# Patient Record
Sex: FEMALE | Race: BLACK OR AFRICAN AMERICAN | Employment: FULL TIME | URBAN - METROPOLITAN AREA
[De-identification: names, ages, dates, MRNs, and addresses within clinical notes are randomized per-mention and may not be internally consistent; named-entity substitution may affect disease eponyms.]

---

## 2023-04-17 PROBLEM — J35.01 CHRONIC TONSILLITIS: Status: ACTIVE | Noted: 2020-05-07

## 2023-04-17 PROBLEM — M25.552 LEFT HIP PAIN: Status: ACTIVE | Noted: 2021-03-22

## 2023-04-17 PROBLEM — H92.01: Status: ACTIVE | Noted: 2020-05-07

## 2023-04-17 PROBLEM — J30.1 NON-SEASONAL ALLERGIC RHINITIS DUE TO POLLEN: Status: ACTIVE | Noted: 2020-05-07

## 2023-04-17 PROBLEM — D50.9 IRON DEFICIENCY ANEMIA: Status: ACTIVE | Noted: 2019-02-21

## 2023-04-17 PROBLEM — N62 HYPERTROPHY OF BREAST: Status: ACTIVE | Noted: 2018-03-28

## 2023-04-17 RX ORDER — GLUCOSAMINE HCL/CHONDROITIN SU 500-400 MG
CAPSULE ORAL
COMMUNITY
Start: 2022-05-09

## 2023-04-17 RX ORDER — GLUCAGON 3 MG/1
POWDER NASAL
COMMUNITY
Start: 2022-08-31

## 2023-04-17 RX ORDER — INSULIN GLARGINE 100 [IU]/ML
INJECTION, SOLUTION SUBCUTANEOUS
COMMUNITY
Start: 2017-05-11 | End: 2023-04-18

## 2023-04-17 RX ORDER — INSULIN LISPRO 100 [IU]/ML
INJECTION, SOLUTION INTRAVENOUS; SUBCUTANEOUS
COMMUNITY
Start: 2023-01-06

## 2023-04-17 RX ORDER — MOMETASONE FUROATE 1 MG/G
CREAM TOPICAL
COMMUNITY
Start: 2022-12-13 | End: 2025-11-12

## 2023-04-17 RX ORDER — LANCETS 30 GAUGE
EACH MISCELLANEOUS
COMMUNITY
Start: 2022-05-09

## 2023-04-17 RX ORDER — SUBCUTANEOUS INSULIN PUMP
EACH MISCELLANEOUS
COMMUNITY
Start: 2023-01-07

## 2023-04-17 RX ORDER — SUBCUTANEOUS INSULIN PUMP
EACH MISCELLANEOUS
COMMUNITY
Start: 2022-07-29

## 2023-04-17 RX ORDER — PEN NEEDLE, DIABETIC 30 GX3/16"
NEEDLE, DISPOSABLE MISCELLANEOUS
COMMUNITY
Start: 2022-05-09 | End: 2023-04-18

## 2023-04-17 RX ORDER — INSULIN ASPART 100 [IU]/ML
INJECTION, SOLUTION INTRAVENOUS; SUBCUTANEOUS
COMMUNITY
Start: 2017-05-11 | End: 2023-04-18

## 2023-04-17 RX ORDER — PROCHLORPERAZINE 25 MG/1
SUPPOSITORY RECTAL
COMMUNITY
Start: 2023-01-20

## 2023-04-17 RX ORDER — IBUPROFEN 800 MG/1
800 TABLET ORAL EVERY 8 HOURS PRN
COMMUNITY
Start: 2013-10-31

## 2023-04-17 RX ORDER — ALBUTEROL SULFATE 90 UG/1
2 AEROSOL, METERED RESPIRATORY (INHALATION) EVERY 6 HOURS PRN
COMMUNITY
Start: 2023-04-13

## 2023-04-17 RX ORDER — HYDROCODONE BITARTRATE AND ACETAMINOPHEN 5; 325 MG/1; MG/1
1 TABLET ORAL EVERY 4 HOURS PRN
COMMUNITY
Start: 2013-10-31 | End: 2023-04-18

## 2023-04-17 RX ORDER — INSULIN LISPRO 100 [IU]/ML
INJECTION, SOLUTION INTRAVENOUS; SUBCUTANEOUS
COMMUNITY
Start: 2022-07-11 | End: 2023-04-18

## 2023-04-17 RX ORDER — MONTELUKAST SODIUM 10 MG/1
10 TABLET ORAL
COMMUNITY
Start: 2023-04-13

## 2023-04-17 NOTE — PROGRESS NOTES
PROGRESS NOTE    SUBJECTIVE:   Tim Gonzales is a 40 y.o. female seen for employment physical for annual employer [employer] [employer] and biometric screening as required to obtain their insurance premium discounts. No complaints or concerns at this time. Chief Complaint    Employment Physical           OBJECTIVE:  /76 (Site: Left Upper Arm, Position: Sitting, Cuff Size: Medium Adult)   Pulse 84   Ht 5' 1\" (1.549 m)   Wt 151 lb 3.2 oz (68.6 kg)   BMI 28.57 kg/m²    There were no vitals filed for this visit. Physical Exam  Vitals reviewed. Constitutional:       General: She is awake. She is not in acute distress. Appearance: Normal appearance. She is well-developed and well-groomed. Comments: Successful venipuncture of the LAC performed, pt tolerated procedure well   Cardiovascular:      Rate and Rhythm: Normal rate and regular rhythm. Heart sounds: Normal heart sounds. Pulmonary:      Effort: Pulmonary effort is normal.   Neurological:      Mental Status: She is alert. Psychiatric:         Behavior: Behavior is cooperative. ASSESSMENT and PLAN    Erickson Isbell was seen today for employment physical.    Diagnoses and all orders for this visit:    Encounter for biometric screening  -     Lipid Panel  -     Hemoglobin A1C  -     Glucose, Random  -     COLLECTION VENOUS BLOOD,VENIPUNCTURE    Discussed HR notification process for credits and that I will contact with results when available    Counseled on benefits of having a primary care provider which includes, but is not limited to, continuity of care and having a medical home when concerns arise. Also enforced that onsite clinic policy states that we are not to take the place of a primary care provider, pt verbalized understanding. I have reviewed the patient's medication list, past medical, family, social, and surgical history in detail and updated the patient record appropriately.     Sj Yanez, APRN - CNP

## 2023-04-18 ENCOUNTER — OFFICE VISIT (OUTPATIENT)
Dept: OCCUPATIONAL MEDICINE | Age: 38
End: 2023-04-18

## 2023-04-18 VITALS
HEIGHT: 61 IN | SYSTOLIC BLOOD PRESSURE: 138 MMHG | HEART RATE: 84 BPM | BODY MASS INDEX: 28.55 KG/M2 | DIASTOLIC BLOOD PRESSURE: 76 MMHG | WEIGHT: 151.2 LBS

## 2023-04-18 DIAGNOSIS — Z00.8 ENCOUNTER FOR BIOMETRIC SCREENING: Primary | ICD-10-CM

## 2023-04-18 RX ORDER — FLUCONAZOLE 150 MG/1
TABLET ORAL
COMMUNITY
Start: 2023-02-18 | End: 2023-04-18

## 2023-04-19 LAB — HBA1C MFR BLD: 7.8 % (ref 4.8–5.6)

## 2023-04-20 LAB
CHOLEST SERPL-MCNC: 167 MG/DL (ref 100–199)
CHOLEST/HDLC SERPL: 2.7 RATIO (ref 0–4.4)
GLUCOSE SERPL-MCNC: 83 MG/DL (ref 70–99)
HDLC SERPL-MCNC: 62 MG/DL
LDLC SERPL CALC-MCNC: 91 MG/DL (ref 0–99)
TRIGL SERPL-MCNC: 75 MG/DL (ref 0–149)
VLDLC SERPL CALC-MCNC: 14 MG/DL (ref 5–40)

## 2023-05-11 ENCOUNTER — OFFICE VISIT (OUTPATIENT)
Dept: OCCUPATIONAL MEDICINE | Age: 38
End: 2023-05-11

## 2023-05-11 VITALS
OXYGEN SATURATION: 97 % | TEMPERATURE: 98.6 F | SYSTOLIC BLOOD PRESSURE: 130 MMHG | DIASTOLIC BLOOD PRESSURE: 92 MMHG | HEART RATE: 74 BPM

## 2023-05-11 DIAGNOSIS — R53.83 FATIGUE, UNSPECIFIED TYPE: Primary | ICD-10-CM

## 2023-05-11 RX ORDER — FERROUS SULFATE 300 MG/5ML
300 LIQUID (ML) ORAL EVERY OTHER DAY
Qty: 75 ML | Refills: 2 | COMMUNITY
Start: 2023-04-23 | End: 2023-07-22

## 2023-05-11 ASSESSMENT — ENCOUNTER SYMPTOMS
SHORTNESS OF BREATH: 0
NAUSEA: 0
ABDOMINAL PAIN: 0
VOMITING: 0
DIARRHEA: 0
CONSTIPATION: 0

## 2023-05-11 NOTE — PROGRESS NOTES
includes, but is not limited to, continuity of care and having a medical home when concerns arise. Also enforced that onsite clinic policy states that we are not to take the place of a primary care provider, pt verbalized understanding. SEs and risk vs benefits associated with medications prescribed discussed with patient who verbalized understanding. Pt verbalized understanding and agreement with plan of care. RTC for persisting/worsening symptoms or new complaints that arise. Discussed signs and symptoms that would warrant immediate evaluation including, but not limited to HA, blurred vision, speech disturbance, difficulty with ambulation/gait, numbness, tingling, weakness, syncope, chest pain, or shortness of breath. I have reviewed the patient's medication list, past medical, family, social, and surgical history in detail and updated the patient record appropriately.     DAVID Hardy

## 2023-08-18 ENCOUNTER — HOSPITAL ENCOUNTER (OUTPATIENT)
Dept: LAB | Age: 38
Setting detail: SPECIMEN
Discharge: HOME | End: 2023-08-21

## 2023-08-18 PROCEDURE — 86787 VARICELLA-ZOSTER ANTIBODY: CPT

## 2023-09-20 PROBLEM — Z76.89 ENCOUNTER TO ESTABLISH CARE WITH NEW DOCTOR: Status: ACTIVE | Noted: 2023-09-20

## 2023-09-20 ASSESSMENT — ENCOUNTER SYMPTOMS
NAUSEA: 0
DIARRHEA: 0
COUGH: 0
SHORTNESS OF BREATH: 0
ABDOMINAL PAIN: 0
VOMITING: 0

## 2023-09-20 NOTE — PROGRESS NOTES
Travis Reyes DO  [place], [address], [address] [facility]  [phone]        ASSESSMENT AND PLAN    Problem List Items Addressed This Visit          Endocrine    Type 1 diabetes mellitus with hyperglycemia [facility])      Patient with type 1 diabetes, diagnosed at age 10. Has done much better since being switched to an insulin pump. States that she sees endocrinology at [facility] but is due for her hemoglobin A1c. Notes that they keep her updated on her diabetic foot exam and her microalbumin test.  Will include hemoglobin A1c with her be well lab test today. We will continue to follow. Relevant Medications    Insulin Glargine, 2 Unit Dial, (TOUJEO MAX SOLOSTAR) 300 UNIT/ML SOPN    Other Relevant Orders    Hemoglobin A1C    Comprehensive Metabolic Panel       Other    Hyperlipidemia      Not currently on medication right now, will check as part of her be well screening. Iron deficiency anemia      Patient taking oral iron, will recheck blood count today. Relevant Orders    CBC with Auto Differential    Vitamin D deficiency      We will check level today. Relevant Orders    Vitamin D 25 Hydroxy    Encounter to establish care with new doctor - Primary     Other Visit Diagnoses       Annual physical exam        Relevant Orders    Be Well Health Screen    Screening for thyroid disorder        Relevant Orders    TSH             The diagnoses and plan were discussed with the patient, who verbalizes understanding and agrees with plan. All questions answered. Chief Complaint    Chief Complaint   Patient presents with    Annual Exam       HISTORY OF PRESENT ILLNESS    40 y.o. female presents today to establish care with a new primary care provider. States that she needs her be well screening for Ald[facility] Precise. Is a type I diabetic, sees endocrinology at [facility].   States that since getting on an insulin pump her blood sugars have

## 2023-09-21 ENCOUNTER — OFFICE VISIT (OUTPATIENT)
Dept: PRIMARY CARE CLINIC | Facility: CLINIC | Age: 38
End: 2023-09-21
Payer: COMMERCIAL

## 2023-09-21 VITALS
SYSTOLIC BLOOD PRESSURE: 118 MMHG | HEART RATE: 74 BPM | HEIGHT: 61 IN | BODY MASS INDEX: 29 KG/M2 | TEMPERATURE: 98.2 F | OXYGEN SATURATION: 98 % | WEIGHT: 153.6 LBS | DIASTOLIC BLOOD PRESSURE: 78 MMHG

## 2023-09-21 DIAGNOSIS — Z76.89 ENCOUNTER TO ESTABLISH CARE WITH NEW DOCTOR: Primary | ICD-10-CM

## 2023-09-21 DIAGNOSIS — Z00.00 ANNUAL PHYSICAL EXAM: ICD-10-CM

## 2023-09-21 DIAGNOSIS — Z13.29 SCREENING FOR THYROID DISORDER: ICD-10-CM

## 2023-09-21 DIAGNOSIS — E10.65 TYPE 1 DIABETES MELLITUS WITH HYPERGLYCEMIA (HCC): ICD-10-CM

## 2023-09-21 DIAGNOSIS — E55.9 VITAMIN D DEFICIENCY: ICD-10-CM

## 2023-09-21 DIAGNOSIS — D50.8 OTHER IRON DEFICIENCY ANEMIA: ICD-10-CM

## 2023-09-21 DIAGNOSIS — E78.5 HYPERLIPIDEMIA, UNSPECIFIED HYPERLIPIDEMIA TYPE: ICD-10-CM

## 2023-09-21 PROBLEM — E10.3393 TYPE 1 DIABETES MELLITUS WITH MODERATE NONPROLIFERATIVE RETINOPATHY OF BOTH EYES WITHOUT MACULAR EDEMA (HCC): Status: ACTIVE | Noted: 2018-01-24

## 2023-09-21 PROBLEM — G47.33 OSA (OBSTRUCTIVE SLEEP APNEA): Status: ACTIVE | Noted: 2023-07-24

## 2023-09-21 LAB
ALBUMIN SERPL-MCNC: 4 G/DL (ref 3.5–5)
ALBUMIN/GLOB SERPL: 1.1 (ref 0.4–1.6)
ALP SERPL-CCNC: 85 U/L (ref 50–136)
ALT SERPL-CCNC: 31 U/L (ref 12–65)
ANION GAP SERPL CALC-SCNC: 3 MMOL/L (ref 2–11)
AST SERPL-CCNC: 30 U/L (ref 15–37)
BASOPHILS # BLD: 0 K/UL (ref 0–0.2)
BASOPHILS NFR BLD: 1 % (ref 0–2)
BILIRUB SERPL-MCNC: 0.6 MG/DL (ref 0.2–1.1)
BUN SERPL-MCNC: 9 MG/DL (ref 6–23)
CALCIUM SERPL-MCNC: 9.7 MG/DL (ref 8.3–10.4)
CHLORIDE SERPL-SCNC: 110 MMOL/L (ref 101–110)
CHOLEST SERPL-MCNC: 150 MG/DL
CO2 SERPL-SCNC: 29 MMOL/L (ref 21–32)
CREAT SERPL-MCNC: 0.7 MG/DL (ref 0.6–1)
DIFFERENTIAL METHOD BLD: ABNORMAL
EOSINOPHIL # BLD: 0.1 K/UL (ref 0–0.8)
EOSINOPHIL NFR BLD: 3 % (ref 0.5–7.8)
ERYTHROCYTE [DISTWIDTH] IN BLOOD BY AUTOMATED COUNT: 16.2 % (ref 11.9–14.6)
EST. AVERAGE GLUCOSE BLD GHB EST-MCNC: 166 MG/DL
GLOBULIN SER CALC-MCNC: 3.7 G/DL (ref 2.8–4.5)
GLUCOSE SERPL-MCNC: 146 MG/DL (ref 65–100)
GLUCOSE SERPL-MCNC: 148 MG/DL (ref 65–100)
HBA1C MFR BLD: 7.4 % (ref 4.8–5.6)
HCT VFR BLD AUTO: 33.3 % (ref 35.8–46.3)
HDLC SERPL-MCNC: 44 MG/DL (ref 40–60)
HGB BLD-MCNC: 10.4 G/DL (ref 11.7–15.4)
IMM GRANULOCYTES # BLD AUTO: 0 K/UL (ref 0–0.5)
IMM GRANULOCYTES NFR BLD AUTO: 0 % (ref 0–5)
LDLC SERPL CALC-MCNC: 89.2 MG/DL
LYMPHOCYTES # BLD: 2.4 K/UL (ref 0.5–4.6)
LYMPHOCYTES NFR BLD: 54 % (ref 13–44)
MCH RBC QN AUTO: 26.7 PG (ref 26.1–32.9)
MCHC RBC AUTO-ENTMCNC: 31.2 G/DL (ref 31.4–35)
MCV RBC AUTO: 85.4 FL (ref 82–102)
MONOCYTES # BLD: 0.4 K/UL (ref 0.1–1.3)
MONOCYTES NFR BLD: 9 % (ref 4–12)
NEUTS SEG # BLD: 1.5 K/UL (ref 1.7–8.2)
NEUTS SEG NFR BLD: 33 % (ref 43–78)
NRBC # BLD: 0 K/UL (ref 0–0.2)
PLATELET # BLD AUTO: 298 K/UL (ref 150–450)
PMV BLD AUTO: 10.8 FL (ref 9.4–12.3)
POTASSIUM SERPL-SCNC: 4.4 MMOL/L (ref 3.5–5.1)
PROT SERPL-MCNC: 7.7 G/DL (ref 6.3–8.2)
RBC # BLD AUTO: 3.9 M/UL (ref 4.05–5.2)
SODIUM SERPL-SCNC: 142 MMOL/L (ref 133–143)
TRIGL SERPL-MCNC: 84 MG/DL (ref 35–150)
TSH, 3RD GENERATION: 1.13 UIU/ML (ref 0.36–3.74)
WBC # BLD AUTO: 4.5 K/UL (ref 4.3–11.1)

## 2023-09-21 PROCEDURE — 99385 PREV VISIT NEW AGE 18-39: CPT | Performed by: FAMILY MEDICINE

## 2023-09-21 RX ORDER — INSULIN GLARGINE 300 U/ML
22 INJECTION, SOLUTION SUBCUTANEOUS DAILY
COMMUNITY
Start: 2023-05-05

## 2023-09-21 SDOH — ECONOMIC STABILITY: FOOD INSECURITY: WITHIN THE PAST 12 MONTHS, YOU WORRIED THAT YOUR FOOD WOULD RUN OUT BEFORE YOU GOT MONEY TO BUY MORE.: NEVER TRUE

## 2023-09-21 SDOH — ECONOMIC STABILITY: FOOD INSECURITY: WITHIN THE PAST 12 MONTHS, THE FOOD YOU BOUGHT JUST DIDN'T LAST AND YOU DIDN'T HAVE MONEY TO GET MORE.: NEVER TRUE

## 2023-09-21 SDOH — ECONOMIC STABILITY: HOUSING INSECURITY
IN THE LAST 12 MONTHS, WAS THERE A TIME WHEN YOU DID NOT HAVE A STEADY PLACE TO SLEEP OR SLEPT IN A SHELTER (INCLUDING NOW)?: NO

## 2023-09-21 SDOH — ECONOMIC STABILITY: INCOME INSECURITY: HOW HARD IS IT FOR YOU TO PAY FOR THE VERY BASICS LIKE FOOD, HOUSING, MEDICAL CARE, AND HEATING?: NOT HARD AT ALL

## 2023-09-21 ASSESSMENT — PATIENT HEALTH QUESTIONNAIRE - PHQ9
2. FEELING DOWN, DEPRESSED OR HOPELESS: 0
SUM OF ALL RESPONSES TO PHQ QUESTIONS 1-9: 0
SUM OF ALL RESPONSES TO PHQ9 QUESTIONS 1 & 2: 0
SUM OF ALL RESPONSES TO PHQ QUESTIONS 1-9: 0
SUM OF ALL RESPONSES TO PHQ QUESTIONS 1-9: 0
1. LITTLE INTEREST OR PLEASURE IN DOING THINGS: 0
SUM OF ALL RESPONSES TO PHQ QUESTIONS 1-9: 0

## 2023-09-21 NOTE — PATIENT INSTRUCTIONS
IT WAS GREAT TO SEE YOU TODAY! I WILL CALL YOU WITH THE RESULTS OF YOUR LABS. PLEASE TAKE ALL MEDICATION AS DISCUSSED. LET ME KNOW HOW YOUR APPOINTMENT WITH VASCULAR GOES.     I WILL SEE YOU AGAIN IN 6 MONTHS BUT PLEASE CALL WITH CONCERNS [phone]

## 2023-09-21 NOTE — ASSESSMENT & PLAN NOTE
Patient with type 1 diabetes, diagnosed at age 10. Has done much better since being switched to an insulin pump. States that she sees endocrinology at [facility] but is due for her hemoglobin A1c. Notes that they keep her updated on her diabetic foot exam and her microalbumin test.  Will include hemoglobin A1c with her be well lab test today. We will continue to follow.

## 2023-09-22 ENCOUNTER — TELEPHONE (OUTPATIENT)
Dept: PRIMARY CARE CLINIC | Facility: CLINIC | Age: 38
End: 2023-09-22

## 2023-09-22 LAB — 25(OH)D3 SERPL-MCNC: 18.6 NG/ML (ref 30–100)

## 2023-09-22 RX ORDER — ERGOCALCIFEROL 1.25 MG/1
50000 CAPSULE ORAL WEEKLY
Qty: 12 CAPSULE | Refills: 1 | Status: SHIPPED | OUTPATIENT
Start: 2023-09-22

## 2023-11-01 DIAGNOSIS — N30.01 ACUTE CYSTITIS WITH HEMATURIA: Primary | ICD-10-CM

## 2023-11-01 DIAGNOSIS — N30.01 ACUTE CYSTITIS WITH HEMATURIA: ICD-10-CM

## 2023-11-01 RX ORDER — CEPHALEXIN 500 MG/1
500 CAPSULE ORAL 2 TIMES DAILY
Qty: 14 CAPSULE | Refills: 0 | Status: SHIPPED | OUTPATIENT
Start: 2023-11-01

## 2023-11-03 ENCOUNTER — OFFICE VISIT (OUTPATIENT)
Dept: OBGYN CLINIC | Age: 38
End: 2023-11-03
Payer: COMMERCIAL

## 2023-11-03 VITALS
SYSTOLIC BLOOD PRESSURE: 112 MMHG | HEIGHT: 61 IN | DIASTOLIC BLOOD PRESSURE: 68 MMHG | WEIGHT: 152 LBS | BODY MASS INDEX: 28.7 KG/M2

## 2023-11-03 DIAGNOSIS — N91.2 AMENORRHEA: ICD-10-CM

## 2023-11-03 DIAGNOSIS — N92.6 IRREGULAR MENSES: Primary | ICD-10-CM

## 2023-11-03 DIAGNOSIS — Z01.419 WOMEN'S ANNUAL ROUTINE GYNECOLOGICAL EXAMINATION: ICD-10-CM

## 2023-11-03 LAB
BACTERIA SPEC CULT: ABNORMAL
BACTERIA SPEC CULT: ABNORMAL
SERVICE CMNT-IMP: ABNORMAL

## 2023-11-03 PROCEDURE — 99385 PREV VISIT NEW AGE 18-39: CPT | Performed by: NURSE PRACTITIONER

## 2023-11-03 PROCEDURE — 76830 TRANSVAGINAL US NON-OB: CPT | Performed by: NURSE PRACTITIONER

## 2023-11-03 RX ORDER — ESCITALOPRAM OXALATE 10 MG/1
10 TABLET ORAL DAILY
Qty: 30 TABLET | Refills: 11 | Status: SHIPPED | OUTPATIENT
Start: 2023-11-03

## 2023-11-03 RX ORDER — FLUCONAZOLE 150 MG/1
150 TABLET ORAL
Qty: 2 TABLET | Refills: 0 | Status: SHIPPED | OUTPATIENT
Start: 2023-11-03 | End: 2023-11-07

## 2023-11-03 RX ORDER — MEDROXYPROGESTERONE ACETATE 10 MG/1
10 TABLET ORAL DAILY
Qty: 30 TABLET | Refills: 3 | Status: SHIPPED | OUTPATIENT
Start: 2023-11-03

## 2023-11-03 ASSESSMENT — PATIENT HEALTH QUESTIONNAIRE - PHQ9
SUM OF ALL RESPONSES TO PHQ QUESTIONS 1-9: 0
1. LITTLE INTEREST OR PLEASURE IN DOING THINGS: 0
SUM OF ALL RESPONSES TO PHQ9 QUESTIONS 1 & 2: 0
SUM OF ALL RESPONSES TO PHQ QUESTIONS 1-9: 0
2. FEELING DOWN, DEPRESSED OR HOPELESS: 0
SUM OF ALL RESPONSES TO PHQ QUESTIONS 1-9: 0
SUM OF ALL RESPONSES TO PHQ QUESTIONS 1-9: 0

## 2023-11-03 NOTE — PROGRESS NOTES
Pt comes in today for irregular periods. Pt states periods have been irregular for about a year. Pt has a period every month but days in between vary. Pt lmp before current one was 06/20--6/27. Period in June was more spotting. LAST PAP:  02/14/2022 negative negative     LAST MAMMO:  never    LMP:  Patient's last menstrual period was 11/01/2023 (exact date).     BIRTH CONTROL:  none    TOBACCO USE:  No    FAMILY HISTORY OF:   Breast Cancer:  Yes   Ovarian Cancer:  No   Uterine Cancer:  No   Colon Cancer:  No    Vitals:    11/03/23 1028   BP: 112/68   Site: Left Upper Arm   Position: Sitting   Weight: 68.9 kg (152 lb)   Height: 1.549 m (5' 1\")        Elena Buitrago  11/03/23  10:40 AM

## 2023-11-03 NOTE — PROGRESS NOTES
I have reviewed the patient's visit today including history, exam and assessment by Deborah Fabry, WHNP-BC. I agree with treatment/plan as above.     Stacia Pascal MD  11:38 AM  11/03/23

## 2023-11-03 NOTE — PROGRESS NOTES
Pearl Billy is a 41 y.o.  who is here for annual exam. Recently treated for UTI. Would like rx for diflucan prn. Known T1DM        Patient's last menstrual period was 2023 (exact date). Menses:normally Q month, but pt did not have period since  until 23  Pt does report a lot of stress lately with family and going through divorce. Has notice more anxiety. Denies SI/HI. Pt is in counseling    Birth Control:abstinence    Last Pap:2022 negative negative     Hx abnormal pap or STD:hx abnormal pap > 15 years ago, normal since then    Last Mammo: never    Fam Hx of Breast, ovarian or uterine cancer:maternal aunt breast cancer    Sexually Active:not currently            ROS:    Breast: Denies pain, lump or nipple discharge    GYN: Denies pelvic pain, discharge, itching, odor or dysuria. Constitutional: Negative for chills and fever. HENT: Negative for severe headaches or vision changes    Respiratory: Negative for cough and shortness of breath. Cardiovascular: Negative for chest pain and palpitations. Gastrointestinal: Negative for nausea and vomiting. Negative for diarrhea and constipation    Genitourinary: Negative for dysuria and hematuria.              Past Medical History:   Diagnosis Date    Abnormal Pap smear of cervix     years ago    Diabetes mellitus (HCC)     JUANA (iron deficiency anemia)        Past Surgical History:   Procedure Laterality Date    BREAST REDUCTION SURGERY       SECTION         Family History   Problem Relation Age of Onset    Breast Cancer Maternal Aunt     Ovarian Cancer Neg Hx     Uterine Cancer Neg Hx     Colon Cancer Neg Hx        Social History     Socioeconomic History    Marital status:      Spouse name: Not on file    Number of children: Not on file    Years of education: Not on file    Highest education level: Not on file   Occupational History    Not on file   Tobacco Use    Smoking status: Never    Smokeless tobacco:

## 2023-11-03 NOTE — ASSESSMENT & PLAN NOTE
RTO for fasting amenorrhea labs TSH, PRL, PCOS, POF labs  US today uterus/ ES normal, ovaries normal  If this menses is prolonged, will start daily provera until followup  Plan f/u after labs  Pt reports a lot of life stressors.  In counseling but interested in trying medication for anxiety  Will start Lexapro and f/u 4 weeks

## 2023-11-06 DIAGNOSIS — N91.2 AMENORRHEA: ICD-10-CM

## 2023-11-06 LAB
ALBUMIN SERPL-MCNC: 4.2 G/DL (ref 3.5–5)
ALBUMIN/GLOB SERPL: 1.1 (ref 0.4–1.6)
ALP SERPL-CCNC: 90 U/L (ref 50–136)
ALT SERPL-CCNC: 27 U/L (ref 12–65)
ANION GAP SERPL CALC-SCNC: 4 MMOL/L (ref 2–11)
AST SERPL-CCNC: 25 U/L (ref 15–37)
BILIRUB SERPL-MCNC: 0.4 MG/DL (ref 0.2–1.1)
BUN SERPL-MCNC: 11 MG/DL (ref 6–23)
CALCIUM SERPL-MCNC: 9.1 MG/DL (ref 8.3–10.4)
CHLORIDE SERPL-SCNC: 104 MMOL/L (ref 101–110)
CO2 SERPL-SCNC: 27 MMOL/L (ref 21–32)
CREAT SERPL-MCNC: 0.7 MG/DL (ref 0.6–1)
ERYTHROCYTE [DISTWIDTH] IN BLOOD BY AUTOMATED COUNT: 14.9 % (ref 11.9–14.6)
EST. AVERAGE GLUCOSE BLD GHB EST-MCNC: 169 MG/DL
ESTRADIOL SERPL-MCNC: 15.84 PG/ML
FSH SERPL-ACNC: 5.6 MIU/ML
GLOBULIN SER CALC-MCNC: 3.8 G/DL (ref 2.8–4.5)
GLUCOSE SERPL-MCNC: 147 MG/DL (ref 65–100)
HBA1C MFR BLD: 7.5 % (ref 4.8–5.6)
HCG SERPL QL: NEGATIVE
HCT VFR BLD AUTO: 33.8 % (ref 35.8–46.3)
HGB BLD-MCNC: 10.3 G/DL (ref 11.7–15.4)
LH SERPL-ACNC: 1.9 MIU/ML
MCH RBC QN AUTO: 26.3 PG (ref 26.1–32.9)
MCHC RBC AUTO-ENTMCNC: 30.5 G/DL (ref 31.4–35)
MCV RBC AUTO: 86.2 FL (ref 82–102)
NRBC # BLD: 0 K/UL (ref 0–0.2)
PLATELET # BLD AUTO: 316 K/UL (ref 150–450)
PMV BLD AUTO: 10.6 FL (ref 9.4–12.3)
POTASSIUM SERPL-SCNC: 3.7 MMOL/L (ref 3.5–5.1)
PROGEST SERPL-MCNC: <0.21 NG/ML
PROLACTIN SERPL-MCNC: 2.8 NG/ML
PROT SERPL-MCNC: 8 G/DL (ref 6.3–8.2)
RBC # BLD AUTO: 3.92 M/UL (ref 4.05–5.2)
SODIUM SERPL-SCNC: 135 MMOL/L (ref 133–143)
TSH W FREE THYROID IF ABNORMAL: 1.13 UIU/ML (ref 0.36–3.74)
WBC # BLD AUTO: 4.1 K/UL (ref 4.3–11.1)

## 2023-11-09 LAB
TESTOST FREE SERPL-MCNC: 0.2 PG/ML (ref 0–4.2)
TESTOST SERPL-MCNC: <3 NG/DL (ref 8–60)

## 2023-11-11 LAB — MIS SERPL-MCNC: 2.64 NG/ML

## 2023-11-16 LAB — DHEA-S SERPL-MCNC: 31.5 UG/DL (ref 57.3–279.2)

## 2023-11-22 RX ORDER — FLUCONAZOLE 150 MG/1
150 TABLET ORAL
Qty: 2 TABLET | Refills: 0 | Status: SHIPPED | OUTPATIENT
Start: 2023-11-22 | End: 2023-11-26

## 2023-11-30 ENCOUNTER — OFFICE VISIT (OUTPATIENT)
Dept: OBGYN CLINIC | Age: 38
End: 2023-11-30
Payer: COMMERCIAL

## 2023-11-30 VITALS
DIASTOLIC BLOOD PRESSURE: 76 MMHG | HEIGHT: 61 IN | BODY MASS INDEX: 28.21 KG/M2 | SYSTOLIC BLOOD PRESSURE: 118 MMHG | WEIGHT: 149.4 LBS

## 2023-11-30 DIAGNOSIS — N91.2 AMENORRHEA: ICD-10-CM

## 2023-11-30 DIAGNOSIS — R45.89 DEPRESSED MOOD: ICD-10-CM

## 2023-11-30 DIAGNOSIS — N89.8 VAGINAL DISCHARGE: ICD-10-CM

## 2023-11-30 DIAGNOSIS — N89.8 VAGINAL DISCHARGE: Primary | ICD-10-CM

## 2023-11-30 PROCEDURE — 99212 OFFICE O/P EST SF 10 MIN: CPT | Performed by: NURSE PRACTITIONER

## 2023-11-30 ASSESSMENT — PATIENT HEALTH QUESTIONNAIRE - PHQ9
7. TROUBLE CONCENTRATING ON THINGS, SUCH AS READING THE NEWSPAPER OR WATCHING TELEVISION: 2
4. FEELING TIRED OR HAVING LITTLE ENERGY: 3
SUM OF ALL RESPONSES TO PHQ QUESTIONS 1-9: 10
1. LITTLE INTEREST OR PLEASURE IN DOING THINGS: 3
8. MOVING OR SPEAKING SO SLOWLY THAT OTHER PEOPLE COULD HAVE NOTICED. OR THE OPPOSITE, BEING SO FIGETY OR RESTLESS THAT YOU HAVE BEEN MOVING AROUND A LOT MORE THAN USUAL: 0
SUM OF ALL RESPONSES TO PHQ9 QUESTIONS 1 & 2: 3
5. POOR APPETITE OR OVEREATING: 1
3. TROUBLE FALLING OR STAYING ASLEEP: 0
SUM OF ALL RESPONSES TO PHQ QUESTIONS 1-9: 10
6. FEELING BAD ABOUT YOURSELF - OR THAT YOU ARE A FAILURE OR HAVE LET YOURSELF OR YOUR FAMILY DOWN: 1
SUM OF ALL RESPONSES TO PHQ QUESTIONS 1-9: 10
9. THOUGHTS THAT YOU WOULD BE BETTER OFF DEAD, OR OF HURTING YOURSELF: 0
10. IF YOU CHECKED OFF ANY PROBLEMS, HOW DIFFICULT HAVE THESE PROBLEMS MADE IT FOR YOU TO DO YOUR WORK, TAKE CARE OF THINGS AT HOME, OR GET ALONG WITH OTHER PEOPLE: 0
2. FEELING DOWN, DEPRESSED OR HOPELESS: 0
SUM OF ALL RESPONSES TO PHQ QUESTIONS 1-9: 10

## 2023-11-30 NOTE — PROGRESS NOTES
Danielle Kowalski is a 41 y.o. Ani Mt who is here today for follow-up    Pt seen 11/3/2023 with the following concerns    \"here for annual exam. Recently treated for UTI. Would like rx for diflucan prn. Known T1DM   Patient's last menstrual period was 2023 (exact date). Menses:normally Q month, but pt did not have period since  until 23  Pt does report a lot of stress lately with family and going through divorce. Has notice more anxiety. Denies SI/HI. Pt is in counseling. \"      Patient's last menstrual period was 2023 (exact date). Past Medical History:   Diagnosis Date    Abnormal Pap smear of cervix     years ago    Diabetes mellitus (HCC)     JUANA (iron deficiency anemia)        Past Surgical History:   Procedure Laterality Date    BREAST REDUCTION SURGERY       SECTION         Family History   Problem Relation Age of Onset    Breast Cancer Maternal Aunt     Ovarian Cancer Neg Hx     Uterine Cancer Neg Hx     Colon Cancer Neg Hx        Social History     Socioeconomic History    Marital status:      Spouse name: Not on file    Number of children: Not on file    Years of education: Not on file    Highest education level: Not on file   Occupational History    Not on file   Tobacco Use    Smoking status: Never    Smokeless tobacco: Never   Substance and Sexual Activity    Alcohol use: Yes     Comment: socially 1-2 month    Drug use: Never    Sexual activity: Not Currently   Other Topics Concern    Not on file   Social History Narrative    Not on file     Social Determinants of Health     Financial Resource Strain: Low Risk  (2023)    Overall Financial Resource Strain (CARDIA)     Difficulty of Paying Living Expenses: Not hard at all   Food Insecurity: Not on file (2023)   Transportation Needs: Unknown (2023)    PRAPARE - Transportation     Lack of Transportation (Medical): Not on file     Lack of Transportation (Non-Medical):  No   Physical Activity: Not

## 2023-11-30 NOTE — ASSESSMENT & PLAN NOTE
11/3/2023: RTO for fasting amenorrhea labs TSH, PRL, PCOS, POF labs  US today uterus/ ES normal, ovaries normal  If this menses is prolonged, will start daily provera until followup  Plan f/u after labs    11/30/2023: Labs normal  LMP 11/1/2023, no menses since, not sexually active  Pt to start cyclic provera if no menses by next month and will let us know

## 2023-11-30 NOTE — PROGRESS NOTES
I have reviewed the patient's visit today including history, exam and assessment by LAURITA Edmonds. I agree with treatment/plan as above.     Isabell Guadarrama MD  3:58 PM  11/30/23

## 2023-11-30 NOTE — ASSESSMENT & PLAN NOTE
11/3/2023: Pt reports a lot of life stressors. In counseling but interested in trying medication for anxiety  Will start Lexapro and f/u 4 weeks    11/30/2023: PHQ -10. Pt reports improvement in moods, less stress, and not caring too much about small things that would normally cause distress.  Continue lexapro 10 mg daily

## 2023-12-03 ENCOUNTER — TELEPHONE (OUTPATIENT)
Dept: OBGYN CLINIC | Age: 38
End: 2023-12-03

## 2023-12-03 PROBLEM — Z01.419 WOMEN'S ANNUAL ROUTINE GYNECOLOGICAL EXAMINATION: Status: RESOLVED | Noted: 2023-11-03 | Resolved: 2023-12-03

## 2023-12-03 LAB
A VAGINAE DNA VAG QL NAA+PROBE: ABNORMAL SCORE
BVAB2 DNA VAG QL NAA+PROBE: ABNORMAL SCORE
C ALBICANS DNA VAG QL NAA+PROBE: NEGATIVE
C GLABRATA DNA VAG QL NAA+PROBE: NEGATIVE
C TRACH RRNA SPEC QL NAA+PROBE: NEGATIVE
MEGA1 DNA VAG QL NAA+PROBE: ABNORMAL SCORE
N GONORRHOEA RRNA SPEC QL NAA+PROBE: NEGATIVE
SPECIMEN SOURCE: ABNORMAL
T VAGINALIS RRNA SPEC QL NAA+PROBE: NEGATIVE

## 2023-12-03 RX ORDER — METRONIDAZOLE 500 MG/1
500 TABLET ORAL 2 TIMES DAILY
Qty: 14 TABLET | Refills: 0 | Status: SHIPPED | OUTPATIENT
Start: 2023-12-03 | End: 2023-12-10

## 2024-02-14 ENCOUNTER — TELEPHONE (OUTPATIENT)
Dept: OBGYN CLINIC | Age: 39
End: 2024-02-14

## 2024-02-14 DIAGNOSIS — E10.65 TYPE 1 DIABETES MELLITUS WITH HYPERGLYCEMIA (HCC): Primary | ICD-10-CM

## 2024-02-29 ENCOUNTER — OFFICE VISIT (OUTPATIENT)
Dept: PRIMARY CARE CLINIC | Facility: CLINIC | Age: 39
End: 2024-02-29

## 2024-02-29 VITALS
BODY MASS INDEX: 27.75 KG/M2 | DIASTOLIC BLOOD PRESSURE: 78 MMHG | HEART RATE: 86 BPM | OXYGEN SATURATION: 97 % | HEIGHT: 61 IN | WEIGHT: 147 LBS | SYSTOLIC BLOOD PRESSURE: 124 MMHG

## 2024-02-29 DIAGNOSIS — F90.9 ADULT ADHD (ATTENTION DEFICIT HYPERACTIVITY DISORDER): Primary | ICD-10-CM

## 2024-02-29 DIAGNOSIS — F41.1 GENERALIZED ANXIETY DISORDER: ICD-10-CM

## 2024-02-29 PROCEDURE — 99214 OFFICE O/P EST MOD 30 MIN: CPT | Performed by: FAMILY MEDICINE

## 2024-02-29 RX ORDER — DEXTROAMPHETAMINE SACCHARATE, AMPHETAMINE ASPARTATE MONOHYDRATE, DEXTROAMPHETAMINE SULFATE AND AMPHETAMINE SULFATE 1.25; 1.25; 1.25; 1.25 MG/1; MG/1; MG/1; MG/1
5 CAPSULE, EXTENDED RELEASE ORAL DAILY
Qty: 31 CAPSULE | Refills: 0 | Status: SHIPPED | OUTPATIENT
Start: 2024-02-29 | End: 2024-03-31

## 2024-02-29 RX ORDER — INSULIN GLARGINE 100 [IU]/ML
30 INJECTION, SOLUTION SUBCUTANEOUS NIGHTLY
COMMUNITY

## 2024-02-29 ASSESSMENT — PATIENT HEALTH QUESTIONNAIRE - PHQ9
8. MOVING OR SPEAKING SO SLOWLY THAT OTHER PEOPLE COULD HAVE NOTICED. OR THE OPPOSITE, BEING SO FIGETY OR RESTLESS THAT YOU HAVE BEEN MOVING AROUND A LOT MORE THAN USUAL: 0
9. THOUGHTS THAT YOU WOULD BE BETTER OFF DEAD, OR OF HURTING YOURSELF: 0
SUM OF ALL RESPONSES TO PHQ QUESTIONS 1-9: 0
5. POOR APPETITE OR OVEREATING: 0
1. LITTLE INTEREST OR PLEASURE IN DOING THINGS: 0
2. FEELING DOWN, DEPRESSED OR HOPELESS: 0
6. FEELING BAD ABOUT YOURSELF - OR THAT YOU ARE A FAILURE OR HAVE LET YOURSELF OR YOUR FAMILY DOWN: 0
3. TROUBLE FALLING OR STAYING ASLEEP: 0
SUM OF ALL RESPONSES TO PHQ QUESTIONS 1-9: 0
4. FEELING TIRED OR HAVING LITTLE ENERGY: 0
7. TROUBLE CONCENTRATING ON THINGS, SUCH AS READING THE NEWSPAPER OR WATCHING TELEVISION: 0
SUM OF ALL RESPONSES TO PHQ9 QUESTIONS 1 & 2: 0
10. IF YOU CHECKED OFF ANY PROBLEMS, HOW DIFFICULT HAVE THESE PROBLEMS MADE IT FOR YOU TO DO YOUR WORK, TAKE CARE OF THINGS AT HOME, OR GET ALONG WITH OTHER PEOPLE: 0

## 2024-02-29 ASSESSMENT — ENCOUNTER SYMPTOMS
SHORTNESS OF BREATH: 0
DIARRHEA: 0
VOMITING: 0
ABDOMINAL PAIN: 0
NAUSEA: 0
COUGH: 0

## 2024-02-29 NOTE — PROGRESS NOTES
Gastrointestinal:  Negative for abdominal pain, diarrhea, nausea and vomiting.   Psychiatric/Behavioral:  Positive for decreased concentration. Negative for dysphoric mood. The patient is nervous/anxious.         PHYSICAL EXAMINATION    Vitals:    02/29/24 1109   BP: 124/78   Pulse: 86   SpO2: 97%         Physical Exam  Vitals and nursing note reviewed.   Constitutional:       General: She is not in acute distress.     Appearance: Normal appearance.   HENT:      Head: Normocephalic and atraumatic.      Right Ear: External ear normal.      Left Ear: External ear normal.      Nose: Nose normal.   Eyes:      Extraocular Movements: Extraocular movements intact.      Pupils: Pupils are equal, round, and reactive to light.   Cardiovascular:      Rate and Rhythm: Normal rate and regular rhythm.      Heart sounds: Normal heart sounds. No murmur heard.  Pulmonary:      Effort: Pulmonary effort is normal. No respiratory distress.      Breath sounds: Normal breath sounds.   Abdominal:      General: Bowel sounds are normal.      Palpations: Abdomen is soft.      Tenderness: There is no abdominal tenderness. There is no right CVA tenderness or left CVA tenderness.   Musculoskeletal:         General: Normal range of motion.      Cervical back: Normal range of motion.   Skin:     General: Skin is warm.      Findings: No rash.   Neurological:      General: No focal deficit present.      Mental Status: She is alert.   Psychiatric:         Mood and Affect: Mood normal.         Behavior: Behavior normal.             Melanie Jones DO  1:15 PM  02/29/24

## 2024-02-29 NOTE — PATIENT INSTRUCTIONS
IT WAS GREAT TO SEE YOU TODAY!    PLEASE TAKE ALL MEDICATION AS DISCUSSED.    ~TAKE THE ADDERALL XR 5 MG IN THE MORNING WITH BREAKFAST TO HELP WITH FOCUS.  STOP IT AND LET ME KNOW IF IT CAUSES DIZZINESS, CHEST PAIN OR PALPITATIONS.    ~KEEP TAKING YOUR LEXAPRO TO HELP WITH ANXIETY.    WORK ON ROUTINE - TRY TO SET AN ALARM AT THE SAME TIME EVERY MORNING AND TRY TO GO TO SLEEP AT THE SAME TIME EVERY NIGHT.  ROUTINE WILL HELP YOU ORGANIZE YOUR DAY AND PREVENT YOU FROM GETTING DISTRACTED OR OFF-TASK    I WILL SEE YOU AGAIN IN 4 WEEKS BUT PLEASE CALL WITH CONCERNS [phone]

## 2024-02-29 NOTE — ASSESSMENT & PLAN NOTE
Recently started on 10 mg of Lexapro by Ob/Gyn due to increased anxiety.  Discussed continuing this while getting her ADHD under control.

## 2024-02-29 NOTE — ASSESSMENT & PLAN NOTE
Diagnosed with testing many years ago but has never taken medicine but notes that it is starting to affect her, both at work and at home.  Will try low-dose Adderall XR 5 mg to see how this works.  Will recheck in one month but advised of side effects and stopping it and letting me know if she has any uncomfortable side effects.

## 2024-03-22 ENCOUNTER — OFFICE VISIT (OUTPATIENT)
Dept: PRIMARY CARE CLINIC | Facility: CLINIC | Age: 39
End: 2024-03-22
Payer: COMMERCIAL

## 2024-03-22 VITALS — BODY MASS INDEX: 27.68 KG/M2 | WEIGHT: 146.6 LBS | OXYGEN SATURATION: 98 % | HEIGHT: 61 IN | HEART RATE: 93 BPM

## 2024-03-22 DIAGNOSIS — F90.9 ADULT ADHD (ATTENTION DEFICIT HYPERACTIVITY DISORDER): Primary | ICD-10-CM

## 2024-03-22 DIAGNOSIS — F41.1 GENERALIZED ANXIETY DISORDER: ICD-10-CM

## 2024-03-22 PROCEDURE — 99214 OFFICE O/P EST MOD 30 MIN: CPT | Performed by: FAMILY MEDICINE

## 2024-03-22 RX ORDER — DEXTROAMPHETAMINE SACCHARATE, AMPHETAMINE ASPARTATE MONOHYDRATE, DEXTROAMPHETAMINE SULFATE AND AMPHETAMINE SULFATE 3.75; 3.75; 3.75; 3.75 MG/1; MG/1; MG/1; MG/1
15 CAPSULE, EXTENDED RELEASE ORAL DAILY
Qty: 31 CAPSULE | Refills: 0 | Status: SHIPPED | OUTPATIENT
Start: 2024-03-22 | End: 2024-04-22

## 2024-03-22 ASSESSMENT — PATIENT HEALTH QUESTIONNAIRE - PHQ9
1. LITTLE INTEREST OR PLEASURE IN DOING THINGS: NOT AT ALL
2. FEELING DOWN, DEPRESSED OR HOPELESS: NOT AT ALL
SUM OF ALL RESPONSES TO PHQ9 QUESTIONS 1 & 2: 0
SUM OF ALL RESPONSES TO PHQ QUESTIONS 1-9: 0

## 2024-03-22 ASSESSMENT — ENCOUNTER SYMPTOMS
DIARRHEA: 0
SHORTNESS OF BREATH: 0
VOMITING: 0
ABDOMINAL PAIN: 0
COUGH: 0

## 2024-03-22 NOTE — PATIENT INSTRUCTIONS
IT WAS GREAT TO SEE YOU TODAY!    PLEASE TAKE ALL MEDICATION AS DISCUSSED.    ~TAKE TWO OF THE ADDERALL XR 5 MG DAILY UNTIL YOU RUN OUT.  THEN FILL THE ADDERALL XR 15 MG TO TAKE DAILY FOR BETTER FOCUS CONTROL.    ~KEEP TAKING THE LEXAPRO TO HELP WITH ANXIETY.    I WILL SEE YOU AGAIN IN 4 WEEKS BUT PLEASE CALL WITH CONCERNS [phone]

## 2024-03-22 NOTE — ASSESSMENT & PLAN NOTE
Patient has not noticed any improvement with 5 mg of Adderall XR.  Will increase to 15 mg, patient to double her dose until she runs out to start increasing the dose.  Will recheck in 4 weeks.

## 2024-03-22 NOTE — PROGRESS NOTES
[facility]  Melanie Lee Robert, DO  [address], [place] [address] [phone]         ASSESSMENT AND PLAN    Problem List Items Addressed This Visit          Other    Adult ADHD (attention deficit hyperactivity disorder) - Primary      Patient has not noticed any improvement with 5 mg of Adderall XR.  Will increase to 15 mg, patient to double her dose until she runs out to start increasing the dose.  Will recheck in 4 weeks.         Relevant Medications    amphetamine-dextroamphetamine (ADDERALL XR) 15 MG extended release capsule    Generalized anxiety disorder      Much improved on Lexapro.  Patient likes her current dose, will continue to monitor.             The diagnoses and plan were discussed with the patient, who verbalizes understanding and agrees with plan.  All questions answered.    Chief Complaint    Chief Complaint   Patient presents with    Medication Refill     Pt is here for a medication follow up.    6 Month Follow-Up         HISTORY OF PRESENT ILLNESS    38 y.o. female with ADHD presents today for follow up.  Started on Adderall XR 5 mg three weeks ago.  States that she has not noticed a difference in taking it.  Still having difficulty finishing her tasks.  States that she has not had any side effects from the medicine.  Denies palpitations, headaches or dizziness.  Denies constipation or dry mouth.  States that she likes her Lexapro and notes that it has helped her calm down.  Just saw Endocrinology, notes that she stopped using the pump and states that the insulin alone is not working.  Just got the Omnipod and states that she has training for it next week.    PAST MEDICAL HISTORY    Past Medical History:   Diagnosis Date    Abnormal Pap smear of cervix     years ago    Diabetes mellitus (HCC)     JUANA (iron deficiency anemia)        PAST SURGICAL HISTORY    Past Surgical History:   Procedure Laterality Date    BREAST REDUCTION SURGERY

## 2024-04-19 ENCOUNTER — APPOINTMENT (RX ONLY)
Dept: URBAN - METROPOLITAN AREA CLINIC 330 | Facility: CLINIC | Age: 39
Setting detail: DERMATOLOGY
End: 2024-04-19

## 2024-04-19 DIAGNOSIS — L28.1 PRURIGO NODULARIS: ICD-10-CM

## 2024-04-19 PROCEDURE — ? COUNSELING

## 2024-04-19 PROCEDURE — ? PRESCRIPTION MEDICATION MANAGEMENT

## 2024-04-19 PROCEDURE — ? PRESCRIPTION

## 2024-04-19 PROCEDURE — 99203 OFFICE O/P NEW LOW 30 MIN: CPT

## 2024-04-19 RX ORDER — CLOBETASOL PROPIONATE 0.5 MG/G
OINTMENT TOPICAL
Qty: 15 | Refills: 1 | Status: ERX | COMMUNITY
Start: 2024-04-19

## 2024-04-19 RX ADMIN — CLOBETASOL PROPIONATE: 0.5 OINTMENT TOPICAL at 00:00

## 2024-04-19 ASSESSMENT — LOCATION DETAILED DESCRIPTION DERM
LOCATION DETAILED: RIGHT SUPERIOR LATERAL UPPER BACK
LOCATION DETAILED: RIGHT PROXIMAL DORSAL FOREARM
LOCATION DETAILED: LEFT SUPERIOR LATERAL UPPER BACK
LOCATION DETAILED: LEFT PROXIMAL DORSAL FOREARM

## 2024-04-19 ASSESSMENT — LOCATION SIMPLE DESCRIPTION DERM
LOCATION SIMPLE: RIGHT FOREARM
LOCATION SIMPLE: RIGHT BACK
LOCATION SIMPLE: LEFT FOREARM
LOCATION SIMPLE: LEFT UPPER BACK

## 2024-04-19 ASSESSMENT — LOCATION ZONE DERM
LOCATION ZONE: TRUNK
LOCATION ZONE: ARM

## 2024-04-19 NOTE — PROCEDURE: PRESCRIPTION MEDICATION MANAGEMENT
Initiate Treatment: Clobetasol ointment qhs under occlusion
Render In Strict Bullet Format?: No
Detail Level: Zone
Plan: PN discussed at length

## 2024-05-10 ENCOUNTER — OFFICE VISIT (OUTPATIENT)
Dept: PRIMARY CARE CLINIC | Facility: CLINIC | Age: 39
End: 2024-05-10
Payer: COMMERCIAL

## 2024-05-10 VITALS
HEIGHT: 61 IN | BODY MASS INDEX: 27.68 KG/M2 | SYSTOLIC BLOOD PRESSURE: 112 MMHG | HEART RATE: 76 BPM | WEIGHT: 146.6 LBS | OXYGEN SATURATION: 98 % | DIASTOLIC BLOOD PRESSURE: 78 MMHG

## 2024-05-10 DIAGNOSIS — F41.1 GENERALIZED ANXIETY DISORDER: ICD-10-CM

## 2024-05-10 DIAGNOSIS — F90.9 ADULT ADHD (ATTENTION DEFICIT HYPERACTIVITY DISORDER): Primary | ICD-10-CM

## 2024-05-10 PROCEDURE — 99214 OFFICE O/P EST MOD 30 MIN: CPT | Performed by: FAMILY MEDICINE

## 2024-05-10 RX ORDER — DEXTROAMPHETAMINE SACCHARATE, AMPHETAMINE ASPARTATE MONOHYDRATE, DEXTROAMPHETAMINE SULFATE AND AMPHETAMINE SULFATE 3.75; 3.75; 3.75; 3.75 MG/1; MG/1; MG/1; MG/1
15 CAPSULE, EXTENDED RELEASE ORAL DAILY
Qty: 30 CAPSULE | Refills: 0 | Status: SHIPPED | OUTPATIENT
Start: 2024-07-10 | End: 2024-08-09

## 2024-05-10 RX ORDER — ESCITALOPRAM OXALATE 20 MG/1
20 TABLET ORAL DAILY
Qty: 90 TABLET | Refills: 1 | Status: SHIPPED | OUTPATIENT
Start: 2024-05-10

## 2024-05-10 RX ORDER — DEXTROAMPHETAMINE SACCHARATE, AMPHETAMINE ASPARTATE MONOHYDRATE, DEXTROAMPHETAMINE SULFATE AND AMPHETAMINE SULFATE 3.75; 3.75; 3.75; 3.75 MG/1; MG/1; MG/1; MG/1
15 CAPSULE, EXTENDED RELEASE ORAL DAILY
Qty: 30 CAPSULE | Refills: 0 | Status: SHIPPED | OUTPATIENT
Start: 2024-06-10 | End: 2024-07-10

## 2024-05-10 RX ORDER — DEXTROAMPHETAMINE SACCHARATE, AMPHETAMINE ASPARTATE MONOHYDRATE, DEXTROAMPHETAMINE SULFATE AND AMPHETAMINE SULFATE 3.75; 3.75; 3.75; 3.75 MG/1; MG/1; MG/1; MG/1
15 CAPSULE, EXTENDED RELEASE ORAL DAILY
Qty: 31 CAPSULE | Refills: 0 | Status: SHIPPED | OUTPATIENT
Start: 2024-05-10 | End: 2024-06-10

## 2024-05-10 ASSESSMENT — PATIENT HEALTH QUESTIONNAIRE - PHQ9
SUM OF ALL RESPONSES TO PHQ QUESTIONS 1-9: 0
SUM OF ALL RESPONSES TO PHQ QUESTIONS 1-9: 0
SUM OF ALL RESPONSES TO PHQ9 QUESTIONS 1 & 2: 0
2. FEELING DOWN, DEPRESSED OR HOPELESS: NOT AT ALL
SUM OF ALL RESPONSES TO PHQ QUESTIONS 1-9: 0
1. LITTLE INTEREST OR PLEASURE IN DOING THINGS: NOT AT ALL
SUM OF ALL RESPONSES TO PHQ QUESTIONS 1-9: 0

## 2024-05-10 ASSESSMENT — ENCOUNTER SYMPTOMS
DIARRHEA: 0
SHORTNESS OF BREATH: 0
COUGH: 0
ABDOMINAL PAIN: 0
VOMITING: 0
NAUSEA: 0

## 2024-05-10 NOTE — PATIENT INSTRUCTIONS
IT WAS GREAT TO SEE YOU TODAY!    PLEASE TAKE ALL MEDICATION AS DISCUSSED.    ~TAKE THE 20 MG OF LEXAPRO DAILY.  TAKE TWO OF YOUR 10 MG UNTIL YOU RUN OUT.    ~WE WILL CONTINUE YOU ON ADDERALL XR 15 MG DAILY FOR THE NEXT 3 MONTHS.  LET ME KNOW IF YOU HAVE ANY PROBLEMS.    I WILL SEE YOU AGAIN IN 3 MONTHS BUT PLEASE CALL WITH CONCERNS [phone]

## 2024-05-10 NOTE — PROGRESS NOTES
[facility]  Melanie Chavezndar, DO  [address]  [place] [id number] [phone]         ASSESSMENT AND PLAN    Problem List Items Addressed This Visit          Other    Adult ADHD (attention deficit hyperactivity disorder) - Primary      Doing well on 15 mg daily, will continue for the next 3 months as she feels this is working.  Sent 3 months supply.         Relevant Medications    amphetamine-dextroamphetamine (ADDERALL XR) 15 MG extended release capsule    amphetamine-dextroamphetamine (ADDERALL XR) 15 MG extended release capsule (Start on 6/10/2024)    amphetamine-dextroamphetamine (ADDERALL XR) 15 MG extended release capsule (Start on 7/10/2024)    Generalized anxiety disorder      More stress recently, patient has had to take two of her Lexapro.  Will increase dose to 20 mg, discussed use of two 10 mg pills daily until she runs out.  Will recheck in 3 months.         Relevant Medications    escitalopram (LEXAPRO) 20 MG tablet        The diagnoses and plan were discussed with the patient, who verbalizes understanding and agrees with plan.  All questions answered.    Chief Complaint    Chief Complaint   Patient presents with    1 Month Follow-Up    Anorexia     Pt has not had an appetite, for a couple of weeks now.        HISTORY OF PRESENT ILLNESS    38 y.o. female with ADHD presents today for follow up.  Last seen six weeks ago, had not noticed any change with 5 mg of Adderall XR so increased to 15 mg daily.  Was also doing better on Lexapro for her anxiety.  States that she feels like she is doing well on the 15 mg in terms of focus but notes a decreased appetite.  Does not want to lose weight, is happy where she is.  Notes that she has not had any bad side effects except occasional palpitations.  States that it is not affecting her blood sugars.  Started on the Omnipod for her blood sugars, states that she is well controlled.  Has noticed more stress

## 2024-05-10 NOTE — ASSESSMENT & PLAN NOTE
More stress recently, patient has had to take two of her Lexapro.  Will increase dose to 20 mg, discussed use of two 10 mg pills daily until she runs out.  Will recheck in 3 months.

## 2024-05-10 NOTE — ASSESSMENT & PLAN NOTE
Doing well on 15 mg daily, will continue for the next 3 months as she feels this is working.  Sent 3 months supply.

## 2024-05-15 ENCOUNTER — TELEPHONE (OUTPATIENT)
Dept: OBGYN CLINIC | Age: 39
End: 2024-05-15

## 2024-05-15 RX ORDER — FLUCONAZOLE 150 MG/1
150 TABLET ORAL
Qty: 2 TABLET | Refills: 0 | Status: SHIPPED | OUTPATIENT
Start: 2024-05-15 | End: 2024-05-19

## 2024-05-15 NOTE — TELEPHONE ENCOUNTER
Pt started on antibiotics. Would like rx for diflucan just in case she gets a yeast infection. Rx sent

## 2024-05-17 ENCOUNTER — OFFICE VISIT (OUTPATIENT)
Dept: ENDOCRINOLOGY | Age: 39
End: 2024-05-17
Payer: COMMERCIAL

## 2024-05-17 VITALS
DIASTOLIC BLOOD PRESSURE: 74 MMHG | HEART RATE: 74 BPM | WEIGHT: 141 LBS | BODY MASS INDEX: 26.64 KG/M2 | SYSTOLIC BLOOD PRESSURE: 110 MMHG

## 2024-05-17 DIAGNOSIS — Z96.41 INSULIN PUMP STATUS: ICD-10-CM

## 2024-05-17 DIAGNOSIS — E10.65 TYPE 1 DIABETES MELLITUS WITH HYPERGLYCEMIA (HCC): Primary | ICD-10-CM

## 2024-05-17 LAB — HBA1C MFR BLD: 7.8 %

## 2024-05-17 PROCEDURE — 95251 CONT GLUC MNTR ANALYSIS I&R: CPT | Performed by: PHYSICIAN ASSISTANT

## 2024-05-17 PROCEDURE — 83036 HEMOGLOBIN GLYCOSYLATED A1C: CPT | Performed by: PHYSICIAN ASSISTANT

## 2024-05-17 PROCEDURE — 99204 OFFICE O/P NEW MOD 45 MIN: CPT | Performed by: PHYSICIAN ASSISTANT

## 2024-05-17 RX ORDER — PROCHLORPERAZINE 25 MG/1
SUPPOSITORY RECTAL
Qty: 1 EACH | Refills: 3 | Status: SHIPPED | OUTPATIENT
Start: 2024-05-17

## 2024-05-17 RX ORDER — INSULIN GLARGINE 100 [IU]/ML
INJECTION, SOLUTION SUBCUTANEOUS
Qty: 15 ML | Refills: 1 | Status: SHIPPED | OUTPATIENT
Start: 2024-05-17

## 2024-05-17 RX ORDER — PROCHLORPERAZINE 25 MG/1
SUPPOSITORY RECTAL
Qty: 9 EACH | Refills: 3 | Status: SHIPPED | OUTPATIENT
Start: 2024-05-17

## 2024-05-17 RX ORDER — INSULIN LISPRO 100 [IU]/ML
INJECTION, SOLUTION INTRAVENOUS; SUBCUTANEOUS
Qty: 60 ML | Refills: 3 | Status: SHIPPED | OUTPATIENT
Start: 2024-05-17

## 2024-05-17 RX ORDER — GLUCAGON INJECTION, SOLUTION 1 MG/.2ML
1 INJECTION, SOLUTION SUBCUTANEOUS PRN
Qty: 2 EACH | Refills: 1 | Status: SHIPPED | OUTPATIENT
Start: 2024-05-17

## 2024-05-17 RX ORDER — INSULIN PMP CART,AUT,G6/7,CNTR
1 EACH SUBCUTANEOUS
COMMUNITY
Start: 2024-03-12

## 2024-05-17 RX ORDER — INSULIN PMP CART,AUT,G6/7,CNTR
EACH SUBCUTANEOUS
Qty: 30 EACH | Refills: 3 | Status: SHIPPED | OUTPATIENT
Start: 2024-05-17

## 2024-05-17 NOTE — PROGRESS NOTES
[facility] ENDOCRINOLOGY   AND   THYROID NODULE CLINIC    Hayley Cross PA-C  [facility] and [facility]roid [facility]odule [facility]  [address] [place] [address], [address]  [place] [address]  Phone [phone]  Facsimile [phone]          Wayne Francis is a 38 y.o. female seen 5/17/2024 at the request of MALLORY Guo for the evaluation of type 1 diabetes on insulin pump        Assessment and Plan:      Interpretation of 72 hour glucose monitor:  At least 72 hours of data were reviewed.  The patient utilizes a dexcom G6 continuous glucose monitoring system.  The average glucose during the reviewed timeframe was 160 with a standard deviation of 70.  There is a pattern of frequent postprandial hyperglycemia after meals.    1. Type 1 diabetes mellitus with hyperglycemia (HCC)  Pt with type 1 diabetes on insulin pump. Doing well with suboptimal control. Urged to consider attending FREE diabetes education. Pump best practices reviewed    Focus on early and accurate bolusing.    Insulin setting changes as below     Encouraged to use barrier method of birthcontrol or start prenatal vitamins. A1c goal <6.5% with good control before pregnancy    At today's visit we discussed sequela associated with uncontrolled diabetes including increased risk of stroke, heart attack, kidney failure, amputation, retinopathy, neuropathy, and nephropathy.  Patient was strongly encouraged to comply with treatment regimen as well as dietary and exercise recommendations to aid in control of this chronic disease to help prevent complications associated with uncontrolled diabetes.    RX glucagon and Urine ketone test strips    - Insulin Disposable Pump (OMNIPOD 5 G6 PODS, GEN 5,) MISC; Inject 1 each into the skin every 72 hours  - AMB POC HEMOGLOBIN A1C  - acetone, urine, test strip; Use if glucose >250 for 4 hours, if positive, proceed to ER E10.9  Dispense: 50 strip; Refill: 1  - GVOKE HYPOPEN 2-PACK 1 MG/0.2ML SOAJ; Inject 1 mg

## 2024-05-21 ENCOUNTER — TELEPHONE (OUTPATIENT)
Dept: ENDOCRINOLOGY | Age: 39
End: 2024-05-21

## 2024-05-21 NOTE — TELEPHONE ENCOUNTER
Dexcom    Prior Authorization not required for patient/medication Case ID: [id number]      Payer: [employer] Patient's Payer    [phone]   Prior Authorization is not required for this medication dosage form and strength at the quantity and days supply requested. The request for the medication has been previously approved and a current authorization exists. Prior Authorization [id number] has previously been approved for a quantity of 1 transmitter per 90 days and is active through 10/03/2024.Maximum of one fill for maintenance medications. Must use [facility]. For questions or emergencies, call [employer] at [phone]. To transfer prescription, call [facility] at [phone] option 0.   View History

## 2024-05-23 ENCOUNTER — OFFICE VISIT (OUTPATIENT)
Dept: OBGYN CLINIC | Age: 39
End: 2024-05-23
Payer: COMMERCIAL

## 2024-05-23 VITALS
BODY MASS INDEX: 26.87 KG/M2 | HEIGHT: 62 IN | SYSTOLIC BLOOD PRESSURE: 112 MMHG | WEIGHT: 146 LBS | DIASTOLIC BLOOD PRESSURE: 68 MMHG

## 2024-05-23 DIAGNOSIS — Z13.9 ENCOUNTER FOR HEALTH-RELATED SCREENING: ICD-10-CM

## 2024-05-23 DIAGNOSIS — Z13.9 ENCOUNTER FOR HEALTH-RELATED SCREENING: Primary | ICD-10-CM

## 2024-05-23 DIAGNOSIS — N91.2 AMENORRHEA: Primary | ICD-10-CM

## 2024-05-23 LAB
CHOLEST SERPL-MCNC: 151 MG/DL (ref 0–200)
GLUCOSE SERPL-MCNC: 85 MG/DL (ref 70–99)
HDLC SERPL-MCNC: 53 MG/DL (ref 40–60)
LDLC SERPL CALC-MCNC: 81 MG/DL (ref 0–100)
TRIGL SERPL-MCNC: 85 MG/DL (ref 0–150)

## 2024-05-23 PROCEDURE — 99213 OFFICE O/P EST LOW 20 MIN: CPT | Performed by: NURSE PRACTITIONER

## 2024-05-23 RX ORDER — MEDROXYPROGESTERONE ACETATE 10 MG/1
10 TABLET ORAL DAILY
Qty: 14 TABLET | Refills: 6 | Status: SHIPPED | OUTPATIENT
Start: 2024-05-23

## 2024-05-23 NOTE — PROGRESS NOTES
titrations: Strengthen carb ratio at 6 PM to 1: 9.  Labs ordered: CMP, lipid panel, urine microalbumin and TSH.  Follow-up in 3 months or sooner if needed.      Allergic rhinitis 10/29/2015     Overview Note:     Last Assessment & Plan:   Formatting of this note might be different from the original.  Seen Dr. Saucedo in remote past. Singulair and albuterol inh rx proper medication use/SE reviewed. Specific avoidance suggestions including keeping windows closed during peak pollen times (Mar-May - tree, May-Aug - grass, Aug - Oct - weed) as well as indoors as much as possible on dry, windy days      Hyperlipidemia 10/29/2015     Overview Note:     Last Assessment & Plan:   Formatting of this note might be different from the original.  Check lipids today. Not currently on statin. Continue to readdress.      Vitamin D deficiency 10/29/2015       Problem List Items Addressed This Visit          Other    Amenorrhea - Primary     11/3/2023: RTO for fasting amenorrhea labs TSH, PRL, PCOS, POF labs  US today uterus/ ES normal, ovaries normal  If this menses is prolonged, will start daily provera until followup  Plan f/u after labs     11/30/2023: Labs normal  LMP 11/1/2023, no menses since, not sexually active  Pt to start cyclic provera if no menses by next month and will let us know    5/23/2024: pt will take UPT at home  If neg, start provera x14 days  May use cyclic if needed. Pt also considering birth control. Not interested in depo or LARCS. We review OCP, ring, patch            No orders of the defined types were placed in this encounter.      Outpatient Encounter Medications as of 5/23/2024   Medication Sig Dispense Refill    medroxyPROGESTERone (PROVERA) 10 MG tablet Take 1 tablet by mouth daily 14 tablet 6    Insulin Disposable Pump (OMNIPOD 5 G6 PODS, GEN 5,) MISC Inject 1 each into the skin every 72 hours      acetone, urine, test strip Use if glucose >250 for 4 hours, if positive, proceed to ER E10.9 50 strip 1

## 2024-05-23 NOTE — ASSESSMENT & PLAN NOTE
11/3/2023: RTO for fasting amenorrhea labs TSH, PRL, PCOS, POF labs  US today uterus/ ES normal, ovaries normal  If this menses is prolonged, will start daily provera until followup  Plan f/u after labs     11/30/2023: Labs normal  LMP 11/1/2023, no menses since, not sexually active  Pt to start cyclic provera if no menses by next month and will let us know    5/23/2024: pt will take UPT at home  If neg, start provera x14 days  May use cyclic if needed. Pt also considering birth control. Not interested in depo or LARCS. We review OCP, ring, patch

## 2024-06-07 ENCOUNTER — TELEPHONE (OUTPATIENT)
Dept: OBGYN CLINIC | Age: 39
End: 2024-06-07

## 2024-06-07 NOTE — TELEPHONE ENCOUNTER
Pt would like to start nuvaring. Rx sent.   Patient denies h/o DVT, stroke, MI, migraines, liver disease or HTN

## 2024-06-09 RX ORDER — ETONOGESTREL AND ETHINYL ESTRADIOL VAGINAL RING .015; .12 MG/D; MG/D
RING VAGINAL
Qty: 3 EACH | Refills: 3 | Status: SHIPPED | OUTPATIENT
Start: 2024-06-09

## 2024-07-01 ENCOUNTER — OFFICE VISIT (OUTPATIENT)
Dept: PRIMARY CARE CLINIC | Facility: CLINIC | Age: 39
End: 2024-07-01
Payer: COMMERCIAL

## 2024-07-01 VITALS
TEMPERATURE: 97.2 F | HEART RATE: 77 BPM | WEIGHT: 150 LBS | DIASTOLIC BLOOD PRESSURE: 70 MMHG | OXYGEN SATURATION: 99 % | SYSTOLIC BLOOD PRESSURE: 110 MMHG | BODY MASS INDEX: 27.88 KG/M2

## 2024-07-01 DIAGNOSIS — B02.9 HERPES ZOSTER WITHOUT COMPLICATION: Primary | ICD-10-CM

## 2024-07-01 PROCEDURE — 99213 OFFICE O/P EST LOW 20 MIN: CPT

## 2024-07-01 RX ORDER — GABAPENTIN 300 MG/1
CAPSULE ORAL
Qty: 39 CAPSULE | Refills: 0 | Status: SHIPPED | OUTPATIENT
Start: 2024-07-01 | End: 2024-07-15

## 2024-07-01 RX ORDER — VALACYCLOVIR HYDROCHLORIDE 1 G/1
1000 TABLET, FILM COATED ORAL 3 TIMES DAILY
Qty: 30 TABLET | Refills: 0 | Status: SHIPPED | OUTPATIENT
Start: 2024-07-01 | End: 2024-07-11

## 2024-07-01 ASSESSMENT — ENCOUNTER SYMPTOMS
VOMITING: 0
COUGH: 0
NAUSEA: 0
CHEST TIGHTNESS: 0
BACK PAIN: 0
SHORTNESS OF BREATH: 0
EYES NEGATIVE: 1
BLOOD IN STOOL: 0
DIARRHEA: 0

## 2024-07-01 NOTE — PROGRESS NOTES
Allergen Reactions    Acetaminophen-Codeine      Other reaction(s): Other (see comments)    Codeine Hallucinations     Other reaction(s): Hives/Swelling-Allergy       REVIEW OF SYSTEMS    Review of Systems   Constitutional:  Negative for chills, diaphoresis, fatigue, fever and unexpected weight change.   Eyes: Negative.    Respiratory:  Negative for cough, chest tightness and shortness of breath.    Cardiovascular:  Negative for chest pain and leg swelling.   Gastrointestinal:  Negative for blood in stool, diarrhea, nausea and vomiting.   Genitourinary:  Negative for difficulty urinating and hematuria.   Musculoskeletal:  Negative for arthralgias, back pain, gait problem, joint swelling, myalgias, neck pain and neck stiffness.   Skin:  Positive for rash.   Allergic/Immunologic: Negative for environmental allergies.   Neurological:  Negative for weakness, numbness and headaches.        Sharp, shooting pain in R groin that comes & goes & is limited to that localized area. Not made better or worse by anything else.    Psychiatric/Behavioral:  Positive for sleep disturbance.           PHYSICAL EXAMINATION    Vitals:    07/01/24 0807   BP: 110/70   Pulse: 77   Temp: 97.2 °F (36.2 °C)   SpO2: 99%         Physical Exam  Vitals reviewed.   Constitutional:       General: She is not in acute distress.     Appearance: Normal appearance. She is normal weight. She is not ill-appearing or toxic-appearing.   HENT:      Head: Normocephalic and atraumatic.   Eyes:      Pupils: Pupils are equal, round, and reactive to light.   Cardiovascular:      Rate and Rhythm: Normal rate and regular rhythm.      Pulses: Normal pulses.      Heart sounds: Normal heart sounds. No murmur heard.     No friction rub.   Pulmonary:      Effort: Pulmonary effort is normal. No respiratory distress.      Breath sounds: Normal breath sounds.   Chest:      Chest wall: No tenderness.   Abdominal:      General: Abdomen is flat.      Palpations: Abdomen is

## 2024-07-01 NOTE — ASSESSMENT & PLAN NOTE
Early presentation & clinical diagnosis based on localized neuralgic symptoms & history of chicken pox. 2 possible small beginnings of lesions, but again, early presentation. Ordered Valacyclovir 1 g tid x 10 days. Patient also requested something for pain due to insomnia from pain last night. Declined oxycodone due to allergy & prednisone due to type 1 diabetes. She was agreeable to trying GABApentin, despite caution that may be less effective or ineffective in shingles. We will do 3 day taper up to tid & then tid for remainder of 14 day treatment. Patient advised to drink lots of water, to d/c the gabapentin, if not effective after 3-4 days & to contact the office with any worsening of symptoms or other concerns.

## 2024-07-01 NOTE — PATIENT INSTRUCTIONS
IT WAS GREAT TO MEET YOU TODAY!    PLEASE TAKE ALL MEDICATION AS DISCUSSED. DRINK LOTS OF WATER. IF SYMPTOMS DO NOT IMPROVE AFTER VALACYCLOVIR, PLEASE CONTACT OFFICE. I HOPE YOU SLEEP BETTER WITH THE GABAPENTIN, BUT PLEASE DISCONTINUE, IF NOT HELPING AFTER DAY 3 OR 4 AND LET US KNOW, IF YOU NEED ALTERNATIVE PAIN MANAGEMENT.    WEAR YOUR SEATBELT.     I HOPE YOU FEEL BETTER SOON. WE WILL SEE YOU AGAIN ON 8/16/2024 AT 11:40 AM, BUT PLEASE CALL WITH CONCERNS [phone]

## 2024-07-10 ENCOUNTER — NURSE ONLY (OUTPATIENT)
Dept: PRIMARY CARE CLINIC | Facility: CLINIC | Age: 39
End: 2024-07-10
Payer: COMMERCIAL

## 2024-07-10 DIAGNOSIS — L30.9 ECZEMA, UNSPECIFIED TYPE: Primary | ICD-10-CM

## 2024-07-10 PROCEDURE — 96372 THER/PROPH/DIAG INJ SC/IM: CPT | Performed by: FAMILY MEDICINE

## 2024-07-10 RX ORDER — DEXAMETHASONE SODIUM PHOSPHATE 4 MG/ML
8 INJECTION, SOLUTION INTRA-ARTICULAR; INTRALESIONAL; INTRAMUSCULAR; INTRAVENOUS; SOFT TISSUE ONCE
Status: COMPLETED | OUTPATIENT
Start: 2024-07-10 | End: 2024-07-10

## 2024-07-10 RX ADMIN — DEXAMETHASONE SODIUM PHOSPHATE 8 MG: 4 INJECTION, SOLUTION INTRA-ARTICULAR; INTRALESIONAL; INTRAMUSCULAR; INTRAVENOUS; SOFT TISSUE at 08:49

## 2024-07-10 NOTE — PROGRESS NOTES
Notes worsening rash that is very itchy.  Has eczema and is having more scarring.  Will have Jael give her a Decadron injection.

## 2024-07-24 ENCOUNTER — PATIENT MESSAGE (OUTPATIENT)
Dept: ENDOCRINOLOGY | Age: 39
End: 2024-07-24

## 2024-07-24 NOTE — TELEPHONE ENCOUNTER
Beckie response:    Hi,  It is possible but we would need to document why you need early change out. Can you tell me more about what is going on causing you to change early?    Sudhir

## 2024-07-24 NOTE — TELEPHONE ENCOUNTER
From: Wayne Francis  To: Hayley Cross  Sent: 7/24/2024 12:59 PM EDT  Subject: Omnipod    Hey I was wondering if it possible to have my rx written to change my Omnipod every two days instead of three?

## 2024-07-25 NOTE — TELEPHONE ENCOUNTER
Beckie response:    Hi,  That is problematic and not typical. I recommend that you call insulet/omnipod and verify that you are following their recommendations for placement and to see if they will replace PODs that have gone out early and that will help us document that you are running out early.    There are also some online videos you can watch about placing PODs    Please let me know how things are going after you touch base with  insulet/omnipod.    I'll see you in less than a month    ~Hayley

## 2024-08-01 ENCOUNTER — PATIENT MESSAGE (OUTPATIENT)
Dept: PRIMARY CARE CLINIC | Facility: CLINIC | Age: 39
End: 2024-08-01

## 2024-08-01 DIAGNOSIS — L30.9 ECZEMA, UNSPECIFIED TYPE: Primary | ICD-10-CM

## 2024-08-01 NOTE — TELEPHONE ENCOUNTER
Karin Klein 8/1/2024 4:42 PM EDT      ----- Message -----  From: Wayne Francis  Sent: 8/1/2024 4:40 PM EDT  To: *  Subject: Dermatology     Hey do you think that I can get a referral to Dermatology? I called to make an appointment at [facility] with MARLENE but they said I needed a referral from my primary.

## 2024-08-14 ENCOUNTER — PATIENT MESSAGE (OUTPATIENT)
Dept: OBGYN CLINIC | Age: 39
End: 2024-08-14

## 2024-08-14 RX ORDER — LEVONORGESTREL/ETHIN.ESTRADIOL 0.1-0.02MG
1 TABLET ORAL DAILY
Qty: 84 TABLET | Refills: 3 | Status: SHIPPED | OUTPATIENT
Start: 2024-08-14

## 2024-08-14 RX ORDER — LEVONORGESTREL/ETHIN.ESTRADIOL 0.1-0.02MG
1 TABLET ORAL DAILY
Qty: 84 TABLET | Refills: 3 | Status: SHIPPED | OUTPATIENT
Start: 2024-08-14 | End: 2024-08-14 | Stop reason: ALTCHOICE

## 2024-08-14 NOTE — TELEPHONE ENCOUNTER
Rx sent. Pt advised in person she can take UPT today. If negative, can start OCPs and repeat UPT in 2 weeks if sexually active.

## 2024-09-01 LAB — DIABETIC RETINOPATHY: NEGATIVE

## 2024-09-25 RX ORDER — LEVONORGESTREL/ETHIN.ESTRADIOL 0.1-0.02MG
1 TABLET ORAL DAILY
Qty: 84 TABLET | Refills: 3 | Status: SHIPPED | OUTPATIENT
Start: 2024-09-25

## 2024-10-03 ENCOUNTER — OFFICE VISIT (OUTPATIENT)
Dept: PRIMARY CARE CLINIC | Facility: CLINIC | Age: 39
End: 2024-10-03
Payer: COMMERCIAL

## 2024-10-03 VITALS
BODY MASS INDEX: 28.89 KG/M2 | DIASTOLIC BLOOD PRESSURE: 70 MMHG | WEIGHT: 153 LBS | TEMPERATURE: 97.4 F | HEIGHT: 61 IN | SYSTOLIC BLOOD PRESSURE: 100 MMHG

## 2024-10-03 DIAGNOSIS — H01.00A BLEPHARITIS OF BOTH UPPER AND LOWER EYELID OF RIGHT EYE, UNSPECIFIED TYPE: Primary | ICD-10-CM

## 2024-10-03 PROCEDURE — 99213 OFFICE O/P EST LOW 20 MIN: CPT | Performed by: FAMILY MEDICINE

## 2024-10-03 RX ORDER — OFLOXACIN 3 MG/ML
5 SOLUTION AURICULAR (OTIC) 2 TIMES DAILY
Qty: 5 ML | Refills: 0 | Status: SHIPPED | OUTPATIENT
Start: 2024-10-03 | End: 2024-10-13

## 2024-10-03 RX ORDER — OFLOXACIN 3 MG/ML
3 SOLUTION/ DROPS OPHTHALMIC 4 TIMES DAILY
Qty: 10 ML | Refills: 0 | Status: SHIPPED | OUTPATIENT
Start: 2024-10-03 | End: 2024-10-10

## 2024-10-03 ASSESSMENT — ENCOUNTER SYMPTOMS
EYE ITCHING: 1
SHORTNESS OF BREATH: 0
ABDOMINAL PAIN: 0
VOMITING: 0
NAUSEA: 0
COUGH: 0
DIARRHEA: 0

## 2024-10-03 NOTE — ASSESSMENT & PLAN NOTE
New issue, likely related to using a new eyeliner. She has thrown this away.  Discussed warm compresses, antihistamines and will add Ofloxacin to protect from infection.  Advised not to wear eye makeup for a few days.

## 2024-10-03 NOTE — PROGRESS NOTES
Pt states that she was given ear drops instead of eye drops. Requests the [facility] on the [address].   
Reactions    Codeine Hallucinations     Other reaction(s): Hives/Swelling-Allergy       REVIEW OF SYSTEMS    Review of Systems   Constitutional:  Negative for fever.   HENT:  Negative for congestion.    Eyes:  Positive for itching. Negative for visual disturbance.   Respiratory:  Negative for cough and shortness of breath.    Cardiovascular:  Negative for chest pain.   Gastrointestinal:  Negative for abdominal pain, diarrhea, nausea and vomiting.   Psychiatric/Behavioral:  Negative for dysphoric mood.           PHYSICAL EXAMINATION    Vitals:    10/03/24 0948   BP: 100/70   Temp: 97.4 °F (36.3 °C)         Physical Exam  Vitals and nursing note reviewed.   Constitutional:       General: She is not in acute distress.     Appearance: Normal appearance.   HENT:      Head: Normocephalic and atraumatic.      Right Ear: External ear normal.      Left Ear: External ear normal.      Nose: Nose normal.   Eyes:      Extraocular Movements: Extraocular movements intact.      Pupils: Pupils are equal, round, and reactive to light.      Comments: Upper and lower eyelid swelling without visible stye or discharge, mild irritation without conjunctival injection   Pulmonary:      Effort: Pulmonary effort is normal. No respiratory distress.   Musculoskeletal:         General: Normal range of motion.      Cervical back: Normal range of motion.   Skin:     General: Skin is warm.      Findings: No rash.   Neurological:      General: No focal deficit present.      Mental Status: She is alert.   Psychiatric:         Mood and Affect: Mood normal.         Behavior: Behavior normal.           Melanie Jones DO  10:17 AM  10/03/24

## 2024-10-09 ENCOUNTER — NURSE ONLY (OUTPATIENT)
Dept: PRIMARY CARE CLINIC | Facility: CLINIC | Age: 39
End: 2024-10-09
Payer: COMMERCIAL

## 2024-10-09 PROCEDURE — 90661 CCIIV3 VAC ABX FR 0.5 ML IM: CPT | Performed by: FAMILY MEDICINE

## 2024-10-09 PROCEDURE — 90471 IMMUNIZATION ADMIN: CPT | Performed by: FAMILY MEDICINE

## 2024-10-09 SDOH — ECONOMIC STABILITY: FOOD INSECURITY: WITHIN THE PAST 12 MONTHS, THE FOOD YOU BOUGHT JUST DIDN'T LAST AND YOU DIDN'T HAVE MONEY TO GET MORE.: NEVER TRUE

## 2024-10-09 SDOH — ECONOMIC STABILITY: FOOD INSECURITY: WITHIN THE PAST 12 MONTHS, YOU WORRIED THAT YOUR FOOD WOULD RUN OUT BEFORE YOU GOT MONEY TO BUY MORE.: NEVER TRUE

## 2024-10-09 SDOH — ECONOMIC STABILITY: INCOME INSECURITY: HOW HARD IS IT FOR YOU TO PAY FOR THE VERY BASICS LIKE FOOD, HOUSING, MEDICAL CARE, AND HEATING?: NOT HARD AT ALL

## 2024-10-29 ENCOUNTER — OFFICE VISIT (OUTPATIENT)
Dept: PRIMARY CARE CLINIC | Facility: CLINIC | Age: 39
End: 2024-10-29
Payer: COMMERCIAL

## 2024-10-29 VITALS
WEIGHT: 157 LBS | HEART RATE: 79 BPM | DIASTOLIC BLOOD PRESSURE: 82 MMHG | SYSTOLIC BLOOD PRESSURE: 112 MMHG | BODY MASS INDEX: 29.64 KG/M2 | HEIGHT: 61 IN | TEMPERATURE: 97.3 F | OXYGEN SATURATION: 98 %

## 2024-10-29 DIAGNOSIS — J06.9 VIRAL UPPER RESPIRATORY TRACT INFECTION: Primary | ICD-10-CM

## 2024-10-29 LAB
EXP DATE SOLUTION: NORMAL
EXP DATE SWAB: NORMAL
EXPIRATION DATE: NORMAL
LOT NUMBER POC: NORMAL
LOT NUMBER SOLUTION: NORMAL
LOT NUMBER SWAB: NORMAL
QUICKVUE INFLUENZA TEST: NEGATIVE
SARS-COV-2 RNA, POC: NEGATIVE
VALID INTERNAL CONTROL, POC: YES

## 2024-10-29 PROCEDURE — 99213 OFFICE O/P EST LOW 20 MIN: CPT | Performed by: FAMILY MEDICINE

## 2024-10-29 PROCEDURE — 87635 SARS-COV-2 COVID-19 AMP PRB: CPT | Performed by: FAMILY MEDICINE

## 2024-10-29 PROCEDURE — 87804 INFLUENZA ASSAY W/OPTIC: CPT | Performed by: FAMILY MEDICINE

## 2024-10-29 ASSESSMENT — PATIENT HEALTH QUESTIONNAIRE - PHQ9
SUM OF ALL RESPONSES TO PHQ QUESTIONS 1-9: 0
2. FEELING DOWN, DEPRESSED OR HOPELESS: NOT AT ALL
SUM OF ALL RESPONSES TO PHQ QUESTIONS 1-9: 0
1. LITTLE INTEREST OR PLEASURE IN DOING THINGS: NOT AT ALL
SUM OF ALL RESPONSES TO PHQ QUESTIONS 1-9: 0
SUM OF ALL RESPONSES TO PHQ QUESTIONS 1-9: 0
SUM OF ALL RESPONSES TO PHQ9 QUESTIONS 1 & 2: 0

## 2024-10-29 ASSESSMENT — ENCOUNTER SYMPTOMS
ABDOMINAL PAIN: 0
VOMITING: 0
SHORTNESS OF BREATH: 0
COUGH: 0
DIARRHEA: 0
NAUSEA: 0

## 2024-10-29 NOTE — ASSESSMENT & PLAN NOTE
One day of symptoms, afebrile.  Exam consistent with URI, negative Flu and COVID.  Discussed use of OTC Nyquil and the importance of hydration.  OK to use Tylenol and Motrin if needed for symptoms.  Patient to let me know if symptoms worsen.

## 2024-10-29 NOTE — PATIENT INSTRUCTIONS
IT WAS GREAT TO SEE YOU TODAY!    I WILL CONTACT YOU WITH THE RESULTS OF YOUR LABS.    PLEASE TAKE ALL MEDICATION AS DISCUSSED.    I WILL SEE YOU AGAIN IN 3 MONTHS BUT PLEASE CALL WITH CONCERNS [phone]

## 2024-10-29 NOTE — PROGRESS NOTES
[facility]  Melanie Lee Robert, DO  [address], [place] [address] [phone]         ASSESSMENT AND PLAN    Problem List Items Addressed This Visit          Respiratory    Viral upper respiratory tract infection - Primary     One day of symptoms, afebrile.  Exam consistent with URI, negative Flu and COVID.  Discussed use of OTC Nyquil and the importance of hydration.  OK to use Tylenol and Motrin if needed for symptoms.  Patient to let me know if symptoms worsen.             The diagnoses and plan were discussed with the patient, who verbalizes understanding and agrees with plan.  All questions answered.    Chief Complaint    Chief Complaint   Patient presents with    Cough    Pharyngitis    Headache       HISTORY OF PRESENT ILLNESS    39 y.o. female with type I DM presents today for congestion.  Started feeling bad last night, had poor appetite when she got home.  Notes coughing and a mild runny nose.  Denies headaches.  Feels weak and tired.  Denies nausea, vomiting or diarrhea.  Denies wheezing.  Notes pain in her chest when she coughs but notes it is dry.  Feels some congestion in her chest.  Notes coworker had COVID last week.    PAST MEDICAL HISTORY    Past Medical History:   Diagnosis Date    Abnormal Pap smear of cervix     years ago    Diabetes mellitus (HCC)     JUANA (iron deficiency anemia)        PAST SURGICAL HISTORY    Past Surgical History:   Procedure Laterality Date    BREAST REDUCTION SURGERY       SECTION         FAMILY HISTORY    Family History   Problem Relation Age of Onset    Diabetes type 2  Mother     Other Mother         loss of muscle tone of upper body    Rheum Arthritis Mother     No Known Problems Sister     No Known Problems Brother     Dementia Maternal Grandmother     Heart Disease Maternal Grandmother     Diabetes type 2  Maternal Grandmother     Elevated Lipids Maternal Grandmother     Cancer Maternal Grandfather

## 2024-11-03 ENCOUNTER — APPOINTMENT (OUTPATIENT)
Dept: GENERAL RADIOLOGY | Age: 39
End: 2024-11-03
Payer: COMMERCIAL

## 2024-11-03 ENCOUNTER — HOSPITAL ENCOUNTER (EMERGENCY)
Age: 39
Discharge: HOME | End: 2024-11-03
Attending: EMERGENCY MEDICINE
Payer: COMMERCIAL

## 2024-11-03 VITALS
BODY MASS INDEX: 28.32 KG/M2 | WEIGHT: 150 LBS | SYSTOLIC BLOOD PRESSURE: 120 MMHG | HEART RATE: 72 BPM | OXYGEN SATURATION: 100 % | TEMPERATURE: 98.3 F | DIASTOLIC BLOOD PRESSURE: 90 MMHG | RESPIRATION RATE: 22 BRPM | HEIGHT: 61 IN

## 2024-11-03 DIAGNOSIS — J18.9 PNEUMONIA OF RIGHT LOWER LOBE DUE TO INFECTIOUS ORGANISM: Primary | ICD-10-CM

## 2024-11-03 LAB
ALBUMIN SERPL-MCNC: 3.3 G/DL (ref 3.5–5)
ALBUMIN/GLOB SERPL: 0.9 (ref 1–1.9)
ALP SERPL-CCNC: 64 U/L (ref 35–104)
ALT SERPL-CCNC: 14 U/L (ref 8–45)
ANION GAP SERPL CALC-SCNC: 13 MMOL/L (ref 7–16)
AST SERPL-CCNC: 37 U/L (ref 15–37)
BASOPHILS # BLD: 0 K/UL (ref 0–0.2)
BASOPHILS NFR BLD: 1 % (ref 0–2)
BILIRUB SERPL-MCNC: 0.3 MG/DL (ref 0–1.2)
BUN SERPL-MCNC: 9 MG/DL (ref 6–23)
CALCIUM SERPL-MCNC: 8.6 MG/DL (ref 8.8–10.2)
CHLORIDE SERPL-SCNC: 103 MMOL/L (ref 98–107)
CO2 SERPL-SCNC: 23 MMOL/L (ref 20–29)
CREAT SERPL-MCNC: 0.73 MG/DL (ref 0.6–1.1)
DIFFERENTIAL METHOD BLD: ABNORMAL
EKG ATRIAL RATE: 63 BPM
EKG DIAGNOSIS: NORMAL
EKG P AXIS: 84 DEGREES
EKG P-R INTERVAL: 137 MS
EKG Q-T INTERVAL: 350 MS
EKG QRS DURATION: 72 MS
EKG QTC CALCULATION (BAZETT): 359 MS
EKG R AXIS: 79 DEGREES
EKG T AXIS: 56 DEGREES
EKG VENTRICULAR RATE: 63 BPM
EOSINOPHIL # BLD: 0.3 K/UL (ref 0–0.8)
EOSINOPHIL NFR BLD: 5 % (ref 0.5–7.8)
ERYTHROCYTE [DISTWIDTH] IN BLOOD BY AUTOMATED COUNT: 13.3 % (ref 11.9–14.6)
GLOBULIN SER CALC-MCNC: 3.8 G/DL (ref 2.3–3.5)
GLUCOSE SERPL-MCNC: 141 MG/DL (ref 70–99)
HCT VFR BLD AUTO: 31.8 % (ref 35.8–46.3)
HGB BLD-MCNC: 10.1 G/DL (ref 11.7–15.4)
IMM GRANULOCYTES # BLD AUTO: 0 K/UL (ref 0–0.5)
IMM GRANULOCYTES NFR BLD AUTO: 0 % (ref 0–5)
LYMPHOCYTES # BLD: 2.7 K/UL (ref 0.5–4.6)
LYMPHOCYTES NFR BLD: 47 % (ref 13–44)
MCH RBC QN AUTO: 27.5 PG (ref 26.1–32.9)
MCHC RBC AUTO-ENTMCNC: 31.8 G/DL (ref 31.4–35)
MCV RBC AUTO: 86.6 FL (ref 82–102)
MONOCYTES # BLD: 0.5 K/UL (ref 0.1–1.3)
MONOCYTES NFR BLD: 9 % (ref 4–12)
NEUTS SEG # BLD: 2.1 K/UL (ref 1.7–8.2)
NEUTS SEG NFR BLD: 38 % (ref 43–78)
NRBC # BLD: 0 K/UL (ref 0–0.2)
NT PRO BNP: 177 PG/ML (ref 0–125)
PLATELET # BLD AUTO: 281 K/UL (ref 150–450)
PMV BLD AUTO: 10.4 FL (ref 9.4–12.3)
POTASSIUM SERPL-SCNC: 5 MMOL/L (ref 3.5–5.1)
PROT SERPL-MCNC: 7.1 G/DL (ref 6.3–8.2)
RBC # BLD AUTO: 3.67 M/UL (ref 4.05–5.2)
SODIUM SERPL-SCNC: 139 MMOL/L (ref 136–145)
TROPONIN T SERPL HS-MCNC: <6 NG/L (ref 0–14)
WBC # BLD AUTO: 5.6 K/UL (ref 4.3–11.1)

## 2024-11-03 PROCEDURE — 84484 ASSAY OF TROPONIN QUANT: CPT

## 2024-11-03 PROCEDURE — 93005 ELECTROCARDIOGRAM TRACING: CPT | Performed by: EMERGENCY MEDICINE

## 2024-11-03 PROCEDURE — 71045 X-RAY EXAM CHEST 1 VIEW: CPT

## 2024-11-03 PROCEDURE — 93010 ELECTROCARDIOGRAM REPORT: CPT | Performed by: INTERNAL MEDICINE

## 2024-11-03 PROCEDURE — 6370000000 HC RX 637 (ALT 250 FOR IP): Performed by: EMERGENCY MEDICINE

## 2024-11-03 PROCEDURE — 83880 ASSAY OF NATRIURETIC PEPTIDE: CPT

## 2024-11-03 PROCEDURE — 80053 COMPREHEN METABOLIC PANEL: CPT

## 2024-11-03 PROCEDURE — 99285 EMERGENCY DEPT VISIT HI MDM: CPT

## 2024-11-03 PROCEDURE — 85025 COMPLETE CBC W/AUTO DIFF WBC: CPT

## 2024-11-03 RX ORDER — LEVOFLOXACIN 500 MG/1
750 TABLET, FILM COATED ORAL ONCE
Status: COMPLETED | OUTPATIENT
Start: 2024-11-03 | End: 2024-11-03

## 2024-11-03 RX ORDER — ALBUTEROL SULFATE 90 UG/1
2 INHALANT RESPIRATORY (INHALATION) EVERY 6 HOURS PRN
Qty: 1 EACH | Refills: 1 | Status: SHIPPED | OUTPATIENT
Start: 2024-11-03

## 2024-11-03 RX ORDER — LEVOFLOXACIN 750 MG/1
750 TABLET, FILM COATED ORAL DAILY
Qty: 6 TABLET | Refills: 0 | Status: SHIPPED | OUTPATIENT
Start: 2024-11-03 | End: 2024-11-09

## 2024-11-03 RX ADMIN — LEVOFLOXACIN 750 MG: 500 TABLET, FILM COATED ORAL at 10:32

## 2024-11-03 ASSESSMENT — PAIN DESCRIPTION - LOCATION: LOCATION: CHEST

## 2024-11-03 ASSESSMENT — PAIN SCALES - GENERAL: PAINLEVEL_OUTOF10: 6

## 2024-11-03 ASSESSMENT — PAIN - FUNCTIONAL ASSESSMENT: PAIN_FUNCTIONAL_ASSESSMENT: 0-10

## 2024-11-03 NOTE — ED TRIAGE NOTES
Pt ambulated to triage    Pt states she has had chest pain and sob since Tuesday.  Pt's PCP, checked for covid and flu.  Both negative.  No n/v, gerd, or arm pain or tingling.

## 2024-11-03 NOTE — ED PROVIDER NOTES
Emergency Department Provider Note                   PCP:                Melanie Jones DO               Age: 39 y.o.      Sex: female   Final diagnosis/impression:  1. Pneumonia of right lower lobe due to infectious organism       Disposition: Discharged    MDM/Clinical Course:  Patient seen by myself at the {Hospital Site:[facility]} emergency department. Patient had signs symptoms and clinical history most consistent with []. My independent analysis/interpretation of laboratory work-up here shows []. Radiology shows []. While under my care, patient received [].    Did discuss with patient that medications given during visit / discharge prescriptions would not nessarily be those selected if patient were pregnant and such medications could potentially cause harm to a developing fetus or pregnancy. Patient is declining pregnancy testing during visit which is reasonable and within her choice as patient here in the emergency department.  Patient does not feel she could be pregnant.    Complexity of Problems Addressed:  {Complexity:78569}    Data Reviewed and Analyzed:    Category 1:   {external source:04722}  I ordered each unique test.  I reviewed the results of each unique test.  {Historian (state who, why needed, what they said):77395}    Category 2:   My Independent EKG Interpretation:   Sinus rhythm, rate of 63, no ST elevation MI, QTc is 359 and is not prolonged, QRS is 72 and is not widened    Radiology:  My limited/focused independent chest x-ray review shows no pneumothorax, no pneumonia, no cardiomegaly, no aortic widening, no noted focal consolidation. [].   Please see official radiology read for additional information, further findings and official interpretation.     Category 3: Discussion of management or test interpretation.  See MDM / clinical course section above for details    Risk of Complications and/or Morbidity of Patient Management:  {Risk:99407}  {Admitted or Consultants

## 2024-11-03 NOTE — DISCHARGE INSTRUCTIONS
We recommend you schedule your albuterol inhaler use every 6 hours for the next 3 days or so.      Please return for any questions, concerns or worsening symptoms, particularly increasing shortness of breath, persistent wheezing, increasing chest pain, passing out episodes.

## 2024-11-04 ENCOUNTER — CARE COORDINATION (OUTPATIENT)
Dept: OTHER | Facility: CLINIC | Age: 39
End: 2024-11-04

## 2024-11-04 NOTE — CARE COORDINATION
Ambulatory Care Coordination Note     2024 1:30 PM     Patient Current Location:  Home: [address]  [place] [address]     This patient was received as a referral from [facility] health report .    ACM contacted the patient by telephone. Verified name and  with patient as identifiers. Provided introduction to self, and explanation of the ACM role.   Patient declined care management services at this time.          ACM: Sharon Erazo LPN     Challenges to be reviewed by the provider   Additional needs identified to be addressed with provider No  none               Method of communication with provider: none.    Has the patient been seen in the ED since your last call? no    Care Summary Note: Patient was seen in the ER due to complaints of CP,cough,SOB. Reports being seen by PCP days prior to ER visit and negative for Covid and Flu.   Work up in ER with labs and CXR showed RLL pneumonia. Patient was stable and discharged home with Rx for Levaquin.   Patient reports today she is slowly improving, she is taking the Levaquin as directed with no side effects.   Discussed DM with patient who reports blood sugars are running normal and she has all meds and is compliant with diet an follow up. Advised to monitor blood sugars closely during illness. Patient plans to call PCP today and schedule ER follow up. Patient states she feels all care needs in place and does not identify any CM needs at this time.    Offered patient enrollment in the Remote Patient Monitoring (RPM) program for in-home monitoring: Patient is not eligible for RPM program because: insurance coverage.       PCP/Specialist follow up:   Future Appointments         Provider Specialty Dept Phone    2024 8:15 AM Amber Calderon, APRN - CNP Obstetrics and Gynecology [phone]            Follow Up:   No further Ambulatory Care Management follow-up scheduled at this time.  Patient  has Ambulatory Care Manager's contact

## 2024-11-12 ENCOUNTER — OFFICE VISIT (OUTPATIENT)
Dept: OBGYN CLINIC | Age: 39
End: 2024-11-12
Payer: COMMERCIAL

## 2024-11-12 VITALS
DIASTOLIC BLOOD PRESSURE: 76 MMHG | BODY MASS INDEX: 28.96 KG/M2 | HEIGHT: 61 IN | SYSTOLIC BLOOD PRESSURE: 112 MMHG | WEIGHT: 153.4 LBS

## 2024-11-12 DIAGNOSIS — Z11.3 SCREEN FOR STD (SEXUALLY TRANSMITTED DISEASE): ICD-10-CM

## 2024-11-12 DIAGNOSIS — Z12.4 PAP SMEAR FOR CERVICAL CANCER SCREENING: Primary | ICD-10-CM

## 2024-11-12 DIAGNOSIS — N89.8 VAGINAL DISCHARGE: ICD-10-CM

## 2024-11-12 DIAGNOSIS — Z01.419 WOMEN'S ANNUAL ROUTINE GYNECOLOGICAL EXAMINATION: ICD-10-CM

## 2024-11-12 PROBLEM — N62 HYPERTROPHY OF BREAST: Status: RESOLVED | Noted: 2018-03-28 | Resolved: 2024-11-12

## 2024-11-12 LAB
HBV SURFACE AG SER QL: NONREACTIVE
HCV AB SER QL: NONREACTIVE
HIV 1+2 AB+HIV1 P24 AG SERPL QL IA: NONREACTIVE
HIV 1/2 RESULT COMMENT: NORMAL
T PALLIDUM AB SER QL IA: NONREACTIVE

## 2024-11-12 PROCEDURE — 90651 9VHPV VACCINE 2/3 DOSE IM: CPT | Performed by: NURSE PRACTITIONER

## 2024-11-12 PROCEDURE — 99395 PREV VISIT EST AGE 18-39: CPT | Performed by: NURSE PRACTITIONER

## 2024-11-12 PROCEDURE — 90471 IMMUNIZATION ADMIN: CPT | Performed by: NURSE PRACTITIONER

## 2024-11-12 ASSESSMENT — PATIENT HEALTH QUESTIONNAIRE - PHQ9
SUM OF ALL RESPONSES TO PHQ9 QUESTIONS 1 & 2: 0
SUM OF ALL RESPONSES TO PHQ QUESTIONS 1-9: 0
2. FEELING DOWN, DEPRESSED OR HOPELESS: NOT AT ALL
SUM OF ALL RESPONSES TO PHQ QUESTIONS 1-9: 0
1. LITTLE INTEREST OR PLEASURE IN DOING THINGS: NOT AT ALL

## 2024-11-12 NOTE — PROGRESS NOTES
Wayne Francis is a 39 y.o.  who is here for AE. No concerns today. Would like std screening        Patient's last menstrual period was 10/21/2024 (approximate).    Menses:Q month, lasting 5 days    Menstrual Complaints: none    Birth Control:OCPs    No cervical cancer screening on file-  neg, HPV neg    Hx abnormal pap or STD:hx abnormal pap many years ago    Hx of receiving HPV vaccination:no    No breast cancer screening on file    Fam Hx of Breast, ovarian or uterine cancer:mat aunt breast cancer    Sexually Active:yes    Number of partners in the last year:1          ROS:    Breast: Denies pain, lump or nipple discharge    GYN: Denies pelvic pain, discharge, itching, odor or dysuria.     Constitutional: Negative for chills and fever.     HENT: Negative for severe headaches or vision changes    Respiratory: Negative for cough and shortness of breath.      Cardiovascular: Negative for chest pain and palpitations.     Gastrointestinal: Negative for nausea and vomiting. Negative for diarrhea and constipation    Genitourinary: Negative for dysuria and hematuria.           Past Medical History:   Diagnosis Date    Abnormal Pap smear of cervix     years ago    ADHD     Anxiety and depression     Diabetes mellitus (HCC)     JUANA (iron deficiency anemia)        Past Surgical History:   Procedure Laterality Date    BREAST REDUCTION SURGERY       SECTION         Family History   Problem Relation Age of Onset    Diabetes type 2  Mother     Other Mother         loss of muscle tone of upper body    Rheum Arthritis Mother     No Known Problems Sister     No Known Problems Brother     Dementia Maternal Grandmother     Heart Disease Maternal Grandmother     Diabetes type 2  Maternal Grandmother     Elevated Lipids Maternal Grandmother     Cancer Maternal Grandfather         throat, smoker    Breast Cancer Maternal Aunt 55    Tourette Syndrome Daughter     Allergy (Severe) Daughter     No Known Problems

## 2024-11-12 NOTE — ASSESSMENT & PLAN NOTE
Pap today + std screening  Birth control - ocps  Gadasil VIS reviewed, pt would like to start series  Rto 1 year

## 2024-11-21 LAB
C TRACH RRNA CVX QL NAA+PROBE: NEGATIVE
COLLECTION METHOD: NORMAL
CYTOLOGIST CVX/VAG CYTO: NORMAL
CYTOLOGY CVX/VAG DOC THIN PREP: NORMAL
DATE OF LMP: NORMAL
HPV APTIMA: NEGATIVE
HPV GENOTYPE REFLEX: NORMAL
Lab: NORMAL
N GONORRHOEA RRNA CVX QL NAA+PROBE: NEGATIVE
PAP SOURCE: NORMAL
PATH REPORT.FINAL DX SPEC: NORMAL
PATHOLOGIST CVX/VAG CYTO: NORMAL
STAT OF ADQ CVX/VAG CYTO-IMP: NORMAL
T VAGINALIS RRNA SPEC QL NAA+PROBE: NEGATIVE

## 2024-11-27 ENCOUNTER — PATIENT MESSAGE (OUTPATIENT)
Dept: PRIMARY CARE CLINIC | Facility: CLINIC | Age: 39
End: 2024-11-27

## 2024-11-29 RX ORDER — FERROUS SULFATE 220 (44)/5
220 ELIXIR ORAL DAILY
Qty: 473 ML | Refills: 3 | Status: SHIPPED | OUTPATIENT
Start: 2024-11-29 | End: 2025-11-29

## 2024-12-12 PROBLEM — Z01.419 WOMEN'S ANNUAL ROUTINE GYNECOLOGICAL EXAMINATION: Status: RESOLVED | Noted: 2024-11-12 | Resolved: 2024-12-12

## 2024-12-19 RX ORDER — ESCITALOPRAM OXALATE 20 MG/1
20 TABLET ORAL DAILY
Qty: 90 TABLET | Refills: 1 | Status: SHIPPED | OUTPATIENT
Start: 2024-12-19

## 2024-12-24 ENCOUNTER — OFFICE VISIT (OUTPATIENT)
Dept: PRIMARY CARE CLINIC | Facility: CLINIC | Age: 39
End: 2024-12-24
Payer: COMMERCIAL

## 2024-12-24 VITALS
OXYGEN SATURATION: 97 % | TEMPERATURE: 97.6 F | HEART RATE: 75 BPM | WEIGHT: 151 LBS | HEIGHT: 61 IN | BODY MASS INDEX: 28.51 KG/M2 | SYSTOLIC BLOOD PRESSURE: 118 MMHG | DIASTOLIC BLOOD PRESSURE: 76 MMHG | RESPIRATION RATE: 16 BRPM

## 2024-12-24 DIAGNOSIS — E10.3393 TYPE 1 DIABETES MELLITUS WITH MODERATE NONPROLIFERATIVE RETINOPATHY OF BOTH EYES WITHOUT MACULAR EDEMA (HCC): ICD-10-CM

## 2024-12-24 DIAGNOSIS — J45.21 MILD INTERMITTENT ASTHMA WITH ALLERGIC RHINITIS WITH ACUTE EXACERBATION: Primary | ICD-10-CM

## 2024-12-24 PROCEDURE — 99214 OFFICE O/P EST MOD 30 MIN: CPT

## 2024-12-24 RX ORDER — BENZONATATE 200 MG/1
200 CAPSULE ORAL 3 TIMES DAILY PRN
Qty: 30 CAPSULE | Refills: 0 | Status: SHIPPED | OUTPATIENT
Start: 2024-12-24

## 2024-12-24 RX ORDER — PREDNISONE 10 MG/1
TABLET ORAL
Qty: 21 EACH | Refills: 0 | Status: SHIPPED | OUTPATIENT
Start: 2024-12-24

## 2024-12-24 RX ORDER — MONTELUKAST SODIUM 10 MG/1
10 TABLET ORAL NIGHTLY
Qty: 90 TABLET | Refills: 3 | Status: SHIPPED | OUTPATIENT
Start: 2024-12-24

## 2024-12-24 RX ORDER — ALBUTEROL SULFATE 90 UG/1
2 INHALANT RESPIRATORY (INHALATION) EVERY 6 HOURS PRN
Qty: 1 EACH | Refills: 1 | Status: SHIPPED | OUTPATIENT
Start: 2024-12-24

## 2024-12-24 NOTE — PROGRESS NOTES
is warm and dry.      Coloration: Skin is not pale.   Neurological:      General: No focal deficit present.      Mental Status: She is alert and oriented to person, place, and time. Mental status is at baseline.   Psychiatric:         Mood and Affect: Mood normal.         Behavior: Behavior normal.         Thought Content: Thought content normal.         Judgment: Judgment normal.             RESULTS    No results found for this or any previous visit (from the past 144 hour(s)).      DAVID Burch  5:02 PM  12/28/24

## 2024-12-28 ASSESSMENT — ENCOUNTER SYMPTOMS
SINUS PRESSURE: 0
TROUBLE SWALLOWING: 0
VOICE CHANGE: 1
SINUS PAIN: 0
WHEEZING: 1
RHINORRHEA: 1
SHORTNESS OF BREATH: 1
EYES NEGATIVE: 1
COUGH: 1
SORE THROAT: 0
GASTROINTESTINAL NEGATIVE: 1

## 2024-12-28 NOTE — ASSESSMENT & PLAN NOTE
Acute, recurrent, unmanaged  - Steroid for mild asthma exacerbation. Advised to monitor sugars closely & avoid nsaids.   - tessalon perles for cough  - continue singulair. Refill sent  - advised flonase 2 sprays each nostril daily for pnd  - albuterol inhaler refill & spacer refill sent  - care gaps discussed & closed  - declines hycodan for cough worse at night  - return if symptoms acutely worsen or continue to persist w/o improvement past treatment  - f/u routine w/ Dr. Jones or sooner for acute care needs

## 2024-12-28 NOTE — ASSESSMENT & PLAN NOTE
Chronic, seen for unrelated acute condition, but A/C ratio urine ordered to close care gap  - additionally, reports last diabetic eye exam negative/ stable in 09/2024. Updated in chart manually (couldn't find results).

## 2024-12-28 NOTE — PATIENT INSTRUCTIONS
IT WAS GREAT TO SEE YOU TUESDAY! I HOPE YOU HAD A VERY MERRY MIRIAM.    WE WILL CONTACT YOU WITH THE RESULTS OF YOUR LABS- PLEASE GET YOUR ALBUMIN CREATINE URINE RATIO FOR DIABETES WHEN YOU HAVE A MOMENT. OTHER CARE GAPS HAVE BEEN UPDATED IN YOUR CHART BASED ON OUR DISCUSSION AT APPOINTMENT.     PLEASE TAKE ALL MEDICATION AS DISCUSSED.   - STEROID TAPER FOR ASTHMA EXACERBATION. AVOID NSAID USE AND KEEP A CLOSE EYE ON YOUR SUGARS ON STEROID.  - CONTINUE SINGULAIR. REFILL SENT.   - CONTINUE ALBUTEROL AS NEEDED. REFILL SENT.   - TESSALON PERLES FOR COUGH.   - FOR POST NASAL DRIP, FLONASE 2 SPRAYS EACH NOSTRIL DAILY (AIM TOWARDS EARS, RATHER THAN STRAIGHT UP NOSE)     DRINK LOTS OF WATER. WEAR YOUR SEATBELT. PLEASE LET US KNOW, IF SYMPTOMS WORSEN OR DO NOT IMPROVE WITH TREATMENT.     WE WILL SEE YOU AGAIN AT YOUR NEXT APPOINTMENT WITH DR. GRAJEDA (none currently scheduled) BUT PLEASE SEND [facility] MESSAGE OR CALL WITH CONCERNS -[phone]

## 2025-01-08 RX ORDER — METRONIDAZOLE 500 MG/1
500 TABLET ORAL 2 TIMES DAILY
Qty: 14 TABLET | Refills: 0 | Status: SHIPPED | OUTPATIENT
Start: 2025-01-08 | End: 2025-01-15

## 2025-01-08 NOTE — PROGRESS NOTES
Pt c/o vaginal discharge and odor. Thinks she has bacterial vaginosis. Rx sent. If no symptoms relief, will do nuswab

## 2025-01-14 ENCOUNTER — NURSE ONLY (OUTPATIENT)
Dept: OBGYN CLINIC | Age: 40
End: 2025-01-14

## 2025-01-14 DIAGNOSIS — Z23 ENCOUNTER FOR IMMUNIZATION: Primary | ICD-10-CM

## 2025-01-14 NOTE — PROGRESS NOTES
Patient here receiving #2 gardasil     NDC: 3387-5679-35  LOT: [id number]  EXP: 11/01/2026  Site: left deltoid     Patient tolerated injection well.